# Patient Record
Sex: FEMALE | Race: BLACK OR AFRICAN AMERICAN | NOT HISPANIC OR LATINO | Employment: UNEMPLOYED | URBAN - METROPOLITAN AREA
[De-identification: names, ages, dates, MRNs, and addresses within clinical notes are randomized per-mention and may not be internally consistent; named-entity substitution may affect disease eponyms.]

---

## 2020-06-26 ENCOUNTER — HISTORICAL (OUTPATIENT)
Dept: RADIOLOGY | Facility: HOSPITAL | Age: 50
End: 2020-06-26

## 2020-10-22 ENCOUNTER — HISTORICAL (OUTPATIENT)
Dept: RADIOLOGY | Facility: HOSPITAL | Age: 50
End: 2020-10-22

## 2021-10-06 ENCOUNTER — HISTORICAL (OUTPATIENT)
Dept: HEMATOLOGY/ONCOLOGY | Facility: CLINIC | Age: 51
End: 2021-10-06

## 2022-06-06 RX ORDER — OMEPRAZOLE 40 MG/1
40 CAPSULE, DELAYED RELEASE ORAL DAILY
Qty: 30 CAPSULE | Refills: 5 | Status: SHIPPED | OUTPATIENT
Start: 2022-06-06 | End: 2023-06-08 | Stop reason: SDUPTHER

## 2022-09-02 RX ORDER — MELOXICAM 15 MG/1
TABLET ORAL
Qty: 30 TABLET | Refills: 5 | Status: SHIPPED | OUTPATIENT
Start: 2022-09-02 | End: 2023-06-08 | Stop reason: SDUPTHER

## 2022-09-02 RX ORDER — HYDROXYCHLOROQUINE SULFATE 200 MG/1
TABLET, FILM COATED ORAL
Qty: 60 TABLET | Refills: 5 | Status: SHIPPED | OUTPATIENT
Start: 2022-09-02 | End: 2023-06-08 | Stop reason: SDUPTHER

## 2022-09-02 RX ORDER — PRAMIPEXOLE DIHYDROCHLORIDE 0.12 MG/1
TABLET ORAL
Qty: 30 TABLET | Refills: 5 | Status: SHIPPED | OUTPATIENT
Start: 2022-09-02 | End: 2023-06-08 | Stop reason: SDUPTHER

## 2022-10-31 DIAGNOSIS — J45.998 ALLERGIC-INFECTIVE ASTHMA: Primary | ICD-10-CM

## 2022-11-14 ENCOUNTER — PROCEDURE VISIT (OUTPATIENT)
Dept: RESPIRATORY THERAPY | Facility: HOSPITAL | Age: 52
End: 2022-11-14
Attending: INTERNAL MEDICINE
Payer: MEDICAID

## 2022-11-14 DIAGNOSIS — J45.998 ALLERGIC-INFECTIVE ASTHMA: ICD-10-CM

## 2022-11-14 PROCEDURE — 94060 EVALUATION OF WHEEZING: CPT

## 2022-11-14 PROCEDURE — 94729 DIFFUSING CAPACITY: CPT

## 2022-11-14 PROCEDURE — 94727 GAS DIL/WSHOT DETER LNG VOL: CPT

## 2022-11-14 RX ORDER — ALBUTEROL SULFATE 0.83 MG/ML
2.5 SOLUTION RESPIRATORY (INHALATION)
Status: COMPLETED | OUTPATIENT
Start: 2022-11-14 | End: 2022-11-14

## 2022-11-14 RX ADMIN — ALBUTEROL SULFATE 2.5 MG: 0.83 SOLUTION RESPIRATORY (INHALATION) at 09:11

## 2023-05-23 PROBLEM — M79.7 FIBROMYALGIA: Status: ACTIVE | Noted: 2023-05-23

## 2023-05-23 PROBLEM — G47.00 INSOMNIA: Status: ACTIVE | Noted: 2023-05-23

## 2023-05-23 PROBLEM — M35.9 UNDIFFERENTIATED CONNECTIVE TISSUE DISEASE: Status: ACTIVE | Noted: 2023-05-23

## 2023-05-23 PROBLEM — G25.81 RESTLESS LEG SYNDROME: Status: ACTIVE | Noted: 2023-05-23

## 2023-06-08 ENCOUNTER — OFFICE VISIT (OUTPATIENT)
Dept: RHEUMATOLOGY | Facility: CLINIC | Age: 53
End: 2023-06-08
Payer: MEDICAID

## 2023-06-08 VITALS
SYSTOLIC BLOOD PRESSURE: 118 MMHG | BODY MASS INDEX: 19.74 KG/M2 | RESPIRATION RATE: 18 BRPM | HEIGHT: 71 IN | OXYGEN SATURATION: 99 % | TEMPERATURE: 98 F | DIASTOLIC BLOOD PRESSURE: 84 MMHG | HEART RATE: 72 BPM | WEIGHT: 141 LBS

## 2023-06-08 DIAGNOSIS — G25.81 RESTLESS LEG SYNDROME: ICD-10-CM

## 2023-06-08 DIAGNOSIS — M35.9 UNDIFFERENTIATED CONNECTIVE TISSUE DISEASE: ICD-10-CM

## 2023-06-08 DIAGNOSIS — G47.00 INSOMNIA, UNSPECIFIED TYPE: ICD-10-CM

## 2023-06-08 DIAGNOSIS — M79.7 FIBROMYALGIA: ICD-10-CM

## 2023-06-08 PROCEDURE — 99214 PR OFFICE/OUTPT VISIT, EST, LEVL IV, 30-39 MIN: ICD-10-PCS | Mod: S$PBB,,,

## 2023-06-08 PROCEDURE — 3074F SYST BP LT 130 MM HG: CPT | Mod: CPTII,,,

## 2023-06-08 PROCEDURE — 3074F PR MOST RECENT SYSTOLIC BLOOD PRESSURE < 130 MM HG: ICD-10-PCS | Mod: CPTII,,,

## 2023-06-08 PROCEDURE — 3079F PR MOST RECENT DIASTOLIC BLOOD PRESSURE 80-89 MM HG: ICD-10-PCS | Mod: CPTII,,,

## 2023-06-08 PROCEDURE — 99214 OFFICE O/P EST MOD 30 MIN: CPT | Mod: S$PBB,,,

## 2023-06-08 PROCEDURE — 99999 PR PBB SHADOW E&M-EST. PATIENT-LVL IV: ICD-10-PCS | Mod: PBBFAC,,,

## 2023-06-08 PROCEDURE — 1159F PR MEDICATION LIST DOCUMENTED IN MEDICAL RECORD: ICD-10-PCS | Mod: CPTII,,,

## 2023-06-08 PROCEDURE — 99214 OFFICE O/P EST MOD 30 MIN: CPT | Mod: PBBFAC

## 2023-06-08 PROCEDURE — 3008F BODY MASS INDEX DOCD: CPT | Mod: CPTII,,,

## 2023-06-08 PROCEDURE — 3008F PR BODY MASS INDEX (BMI) DOCUMENTED: ICD-10-PCS | Mod: CPTII,,,

## 2023-06-08 PROCEDURE — 1159F MED LIST DOCD IN RCRD: CPT | Mod: CPTII,,,

## 2023-06-08 PROCEDURE — 99999 PR PBB SHADOW E&M-EST. PATIENT-LVL IV: CPT | Mod: PBBFAC,,,

## 2023-06-08 PROCEDURE — 3079F DIAST BP 80-89 MM HG: CPT | Mod: CPTII,,,

## 2023-06-08 RX ORDER — ALBUTEROL SULFATE 1.25 MG/3ML
1 SOLUTION RESPIRATORY (INHALATION) EVERY 6 HOURS PRN
COMMUNITY

## 2023-06-08 RX ORDER — GABAPENTIN 300 MG/1
300 CAPSULE ORAL NIGHTLY
COMMUNITY
Start: 2023-02-19 | End: 2023-06-08 | Stop reason: SDUPTHER

## 2023-06-08 RX ORDER — MELOXICAM 15 MG/1
TABLET ORAL
Qty: 30 TABLET | Refills: 5 | Status: SHIPPED | OUTPATIENT
Start: 2023-06-08 | End: 2023-09-08

## 2023-06-08 RX ORDER — HYDROXYCHLOROQUINE SULFATE 200 MG/1
TABLET, FILM COATED ORAL
Qty: 60 TABLET | Refills: 5 | Status: SHIPPED | OUTPATIENT
Start: 2023-06-08 | End: 2023-09-08 | Stop reason: SDUPTHER

## 2023-06-08 RX ORDER — GABAPENTIN 300 MG/1
300 CAPSULE ORAL NIGHTLY
Qty: 30 CAPSULE | Refills: 5 | Status: SHIPPED | OUTPATIENT
Start: 2023-06-08 | End: 2023-09-08 | Stop reason: SDUPTHER

## 2023-06-08 RX ORDER — BUDESONIDE AND FORMOTEROL FUMARATE DIHYDRATE 160; 4.5 UG/1; UG/1
2 AEROSOL RESPIRATORY (INHALATION) DAILY
COMMUNITY
Start: 2023-05-19 | End: 2024-03-14

## 2023-06-08 RX ORDER — CYPROHEPTADINE HYDROCHLORIDE 4 MG/1
4 TABLET ORAL 2 TIMES DAILY
COMMUNITY
Start: 2023-06-05

## 2023-06-08 RX ORDER — PRAMIPEXOLE DIHYDROCHLORIDE 0.12 MG/1
0.12 TABLET ORAL NIGHTLY
Qty: 30 TABLET | Refills: 5 | Status: SHIPPED | OUTPATIENT
Start: 2023-06-08 | End: 2023-09-08 | Stop reason: SDUPTHER

## 2023-06-08 RX ORDER — ALBUTEROL SULFATE 90 UG/1
2 AEROSOL, METERED RESPIRATORY (INHALATION)
COMMUNITY
Start: 2022-12-27 | End: 2023-09-14 | Stop reason: SDUPTHER

## 2023-06-08 RX ORDER — OMEPRAZOLE 40 MG/1
40 CAPSULE, DELAYED RELEASE ORAL DAILY
Qty: 30 CAPSULE | Refills: 5 | Status: SHIPPED | OUTPATIENT
Start: 2023-06-08 | End: 2023-09-08 | Stop reason: SDUPTHER

## 2023-06-08 NOTE — ASSESSMENT & PLAN NOTE
Restart hydroxychloroquine 200 mg b.i.d.  Restart meloxicam 15 mg daily  Continue omeprazole 40 mg daily    Labs ordered today

## 2023-06-08 NOTE — ASSESSMENT & PLAN NOTE
The medications restarted today should also help with her sleep  Avoid caffeine, alcohol and stimulants.  Power down electronic devices at least one hour prior to bedtime.  Keep room dark; use eye mask or relaxation sound machine to promote rest.  Sleep hygiene refers to actions that tend to improve and maintain good sleep:  Sleep as long as necessary to feel rested (usually seven to eight hours for adults) and then get out of bed  Maintain a regular sleep schedule, particularly a regular wake-up time in the morning  Avoid smoking or other nicotine intake, particularly during the evening

## 2023-06-08 NOTE — PROGRESS NOTES
Subjective:           Patient ID: Sarah Saravia is a 52 y.o. female.    Chief Complaint: Follow-up (Right leg pain and spasms )       is a 51 y/o female here for a f./u. She was initially a self referral for joint poin and shortness of breath. She established care with Dr. Marshall on 11/5/2021 . Past labs: YING 1:80 Nucleolar, (look at labs from [facility]). And she was initiated on hydroxychloroquine.  She did have 1 follow-up appointment on March 2022 with Dr. Marshall with no medicine changes and she has been lost to follow up. Her last Rheumatology appointment was 3/11/22. (Over 1 year ago) No recent labs to review. She does report that she is not taking any medicine right now because she didn't have any more refills.   Today she reports joint pain involving the MCP PIP wrist elbow shoulders hips knees and ankles bilaterally.  The pain is 2/10 in intensity dull in quality and continuous.  That is associated with a morning stiffness lasting for less than 60 minutes.  Is also having difficulty maintaining a good night of sleep.  This has been associated with myalgias.  Muscle aches are 4/10 in intensity dull in quality and continuous. Also reports shocking pain on right lower extremity leg and restless leg overnight They are associated with fatigue. Denies fever, chills or recent infections.         Review of Systems   Constitutional:  Negative for appetite change, chills and fever.   HENT:  Negative for congestion, ear pain, mouth sores, nosebleeds and trouble swallowing.    Eyes:  Negative for photophobia and discharge.   Respiratory:  Negative for chest tightness and shortness of breath.    Cardiovascular:  Negative for chest pain.   Gastrointestinal:  Negative for abdominal pain and vomiting.   Endocrine: Negative.    Genitourinary:  Negative for hematuria.   Musculoskeletal:         As per HPI   Skin:  Negative for rash.   Neurological:  Negative for weakness.       Objective:   /84 (BP  "Location: Left arm, Patient Position: Sitting, BP Method: Medium (Automatic))   Pulse 72   Temp 98.3 °F (36.8 °C) (Oral)   Resp 18   Ht 5' 11" (1.803 m)   Wt 64 kg (141 lb)   SpO2 99%   BMI 19.67 kg/m²          Physical Exam   Constitutional: She is oriented to person, place, and time. She appears well-developed and well-nourished. No distress.   HENT:   Head: Normocephalic and atraumatic.   Right Ear: External ear normal.   Left Ear: External ear normal.   Eyes: Pupils are equal, round, and reactive to light.   Cardiovascular: Normal rate.   Pulmonary/Chest: Effort normal.   Abdominal: Soft. There is no abdominal tenderness.   Musculoskeletal:      Cervical back: Neck supple.   Lymphadenopathy:     She has no cervical adenopathy.   Neurological: She is alert and oriented to person, place, and time. She displays normal reflexes. No cranial nerve deficit or sensory deficit. She exhibits normal muscle tone. Coordination normal.   Skin: No rash noted. No erythema.   Vitals reviewed.      Right Side Rheumatological Exam     Examination finds the 1st PIP, 1st MCP, 2nd PIP, 2nd MCP, 3rd PIP, 3rd MCP, 4th PIP, 4th MCP, 5th PIP and 5th MCP normal.    Left Side Rheumatological Exam     Examination finds the 1st PIP, 1st MCP, 2nd PIP, 2nd MCP, 3rd PIP, 3rd MCP, 4th PIP, 4th MCP, 5th PIP and 5th MCP normal.         Completed Fibromyalgia exam 12/18 tender points.    No data to display     Assessment:         Medication List with Changes/Refills   Current Medications    ALBUTEROL (ACCUNEB) 1.25 MG/3 ML NEBU    Inhale 1 ampule into the lungs every 6 (six) hours as needed.       Start Date: --        End Date: --    ALBUTEROL (PROVENTIL/VENTOLIN HFA) 90 MCG/ACTUATION INHALER    Inhale 2 puffs into the lungs every 4 to 6 hours as needed.       Start Date: 12/27/2022End Date: --    CYPROHEPTADINE (PERIACTIN) 4 MG TABLET    Take 4 mg by mouth 2 (two) times daily.       Start Date: 6/5/2023  End Date: --    SYMBICORT 160-4.5 " MCG/ACTUATION HFAA    Inhale 2 puffs into the lungs once daily.       Start Date: 5/19/2023 End Date: --   Changed and/or Refilled Medications    Modified Medication Previous Medication    GABAPENTIN (NEURONTIN) 300 MG CAPSULE gabapentin (NEURONTIN) 300 MG capsule       Take 1 capsule (300 mg total) by mouth every evening.    Take 300 mg by mouth every evening.       Start Date: 6/8/2023  End Date: --    Start Date: 2/19/2023 End Date: 6/8/2023    HYDROXYCHLOROQUINE (PLAQUENIL) 200 MG TABLET hydrOXYchloroQUINE (PLAQUENIL) 200 mg tablet       TAKE 1 TABLET BY MOUTH TWICE DAILY WITH FOOD OR MILK    TAKE 1 TABLET BY MOUTH TWICE DAILY WITH FOOD OR MILK       Start Date: 6/8/2023  End Date: --    Start Date: 9/2/2022  End Date: 6/8/2023    MELOXICAM (MOBIC) 15 MG TABLET meloxicam (MOBIC) 15 MG tablet       TAKE 1 TABLET BY MOUTH DAILY AS NEEDED FOR PAIN AFTER LUNCH    TAKE 1 TABLET BY MOUTH DAILY AS NEEDED FOR PAIN AFTER LUNCH       Start Date: 6/8/2023  End Date: --    Start Date: 9/2/2022  End Date: 6/8/2023    OMEPRAZOLE (PRILOSEC) 40 MG CAPSULE omeprazole (PRILOSEC) 40 MG capsule       Take 1 capsule (40 mg total) by mouth once daily.    Take 1 capsule (40 mg total) by mouth once daily.       Start Date: 6/8/2023  End Date: 6/7/2024    Start Date: 6/6/2022  End Date: 6/8/2023    PRAMIPEXOLE (MIRAPEX) 0.125 MG TABLET pramipexole (MIRAPEX) 0.125 MG tablet       Take 1 tablet (0.125 mg total) by mouth every evening.    TAKE 1 TABLET BY MOUTH AT BEDTIME       Start Date: 6/8/2023  End Date: --    Start Date: 9/2/2022  End Date: 6/8/2023         ICD-10-CM ICD-9-CM   1. Restless leg syndrome  G25.81 333.94   2. Undifferentiated connective tissue disease  M35.9 710.9   3. Fibromyalgia  M79.7 729.1   4. Insomnia, unspecified type  G47.00 780.52           Plan:       1. Restless leg syndrome  Assessment & Plan:  Restart Mirapex 0.125 mg nightly    Orders:  -     pramipexole (MIRAPEX) 0.125 MG tablet; Take 1 tablet (0.125 mg  total) by mouth every evening.  Dispense: 30 tablet; Refill: 5    2. Undifferentiated connective tissue disease  Assessment & Plan:  Restart hydroxychloroquine 200 mg b.i.d.  Restart meloxicam 15 mg daily  Continue omeprazole 40 mg daily    Labs ordered today    Orders:  -     hydrOXYchloroQUINE (PLAQUENIL) 200 mg tablet; TAKE 1 TABLET BY MOUTH TWICE DAILY WITH FOOD OR MILK  Dispense: 60 tablet; Refill: 5  -     meloxicam (MOBIC) 15 MG tablet; TAKE 1 TABLET BY MOUTH DAILY AS NEEDED FOR PAIN AFTER LUNCH  Dispense: 30 tablet; Refill: 5  -     omeprazole (PRILOSEC) 40 MG capsule; Take 1 capsule (40 mg total) by mouth once daily.  Dispense: 30 capsule; Refill: 5  -     CBC Auto Differential; Future; Expected date: 06/08/2023  -     Comprehensive Metabolic Panel; Future; Expected date: 06/08/2023  -     CRP, High Sensitivity; Future; Expected date: 06/08/2023  -     Vitamin D; Future; Expected date: 06/08/2023  -     Rheumatoid Quantitative; Future; Expected date: 06/08/2023  -     Cyclic Citrullinated Peptide Antibody, IgG; Future; Expected date: 06/08/2023  -     Antinuclear Ab, HEp-2 Substrate; Future; Expected date: 06/08/2023  -     Hepatitis B Surface Antigen; Future; Expected date: 06/08/2023  -     Hepatitis C Antibody; Future; Expected date: 06/08/2023  -     TSH; Future; Expected date: 06/08/2023  -     T4, Free; Future; Expected date: 06/08/2023    3. Fibromyalgia  Assessment & Plan:  Restart gabapentin 300 mg nightly    Orders:  -     gabapentin (NEURONTIN) 300 MG capsule; Take 1 capsule (300 mg total) by mouth every evening.  Dispense: 30 capsule; Refill: 5    4. Insomnia, unspecified type  Assessment & Plan:  The medications restarted today should also help with her sleep  Avoid caffeine, alcohol and stimulants.  Power down electronic devices at least one hour prior to bedtime.  Keep room dark; use eye mask or relaxation sound machine to promote rest.  Sleep hygiene refers to actions that tend to improve  and maintain good sleep:  Sleep as long as necessary to feel rested (usually seven to eight hours for adults) and then get out of bed  Maintain a regular sleep schedule, particularly a regular wake-up time in the morning  Avoid smoking or other nicotine intake, particularly during the evening

## 2023-06-14 ENCOUNTER — TELEPHONE (OUTPATIENT)
Dept: RHEUMATOLOGY | Facility: CLINIC | Age: 53
End: 2023-06-14
Payer: MEDICAID

## 2023-06-14 DIAGNOSIS — E55.9 VITAMIN D DEFICIENCY: Primary | ICD-10-CM

## 2023-06-14 RX ORDER — ERGOCALCIFEROL 1.25 MG/1
50000 CAPSULE ORAL
Qty: 5 CAPSULE | Refills: 3 | Status: SHIPPED | OUTPATIENT
Start: 2023-06-14

## 2023-06-14 NOTE — TELEPHONE ENCOUNTER
Labs reviewed. Her labs are consistent with her diagnosis with  undifferentiated connective tissue disease. It does show that she still has inflammation but the hydroxychloroquine will help with this. Her liver functions,kidney functions, and thyroid are all normal. Her RBC is high but I will send these labs to her primary care, Kailey Pollack. She should continue all of her medications prescribed at her visit.    Maria Isabel, Please fax her labs to her PCP Kailey Pollack at [facility]  Fax: [phone]

## 2023-09-07 NOTE — ASSESSMENT & PLAN NOTE
Currently taking  mg daily  Increase  hydroxychloroquine to  200 mg b.i.d.  Stop meloxicam 15 mg daily (therapy not effective)  Start Diclofenac 75 mg daily PRN for pain  Continue omeprazole 40 mg daily     Labs ordered at last visit and reviewed with patient today during this office visit. Recent labs completed with PCP- will request those labs

## 2023-09-07 NOTE — PROGRESS NOTES
Subjective:           Patient ID: Sarah Saravia is a 53 y.o. female.    Chief Complaint: Follow-up (Patient has some concern about her medications. /She isn't to taste or smell for 3 weeks now. She did do/A at home covid test  a week ago . )       is a 51 y/o female here for a f./u. She was initially a self referral for joint poin and shortness of breath. She established care with Dr. Marshall on 11/5/2021 . Past labs: YING 1:80 Nucleolar, (look at labs from [facility]). And she was initiated on hydroxychloroquine.  She did have 1 follow-up appointment on March 2022 with Dr. Marshall with no medicine changes and she has been lost to follow up. Her last Rheumatology appointment was 3/11/22. (Over 1 year ago) She reports her PCP recently did blood work and she was told that she has a fatty liver- will request those labs as her liver enzymes were NML with her last blood work. She did show an abdominal ultrasound result from [facility] which showed renal cysts, splenomegaly and mild hepatomegaly. She reports that she does have improved joint pain since starting restarting the  medication. Reports restless legs during the day and she sometimes takes Mirapex in the AM and PM.     Today she reports joint pain involving the MCP PIP wrist elbow shoulders hips knees and ankles bilaterally.  The pain is 2/10 in intensity dull in quality and continuous.  That is associated with a morning stiffness lasting for less than 60 minutes.  Is also having difficulty maintaining a good night of sleep.  This has been associated with myalgias.  Muscle aches are 4/10 in intensity dull in quality and continuous. A They are associated with fatigue. Denies fever, chills or recent infections.           Review of Systems   Constitutional:  Negative for appetite change, chills and fever.   HENT:  Negative for congestion, ear pain, mouth sores, nosebleeds and trouble swallowing.    Eyes:  Negative for photophobia and discharge.  "  Respiratory:  Negative for chest tightness and shortness of breath.    Cardiovascular:  Negative for chest pain.   Gastrointestinal:  Negative for abdominal pain and vomiting.   Endocrine: Negative.    Genitourinary:  Negative for hematuria.   Musculoskeletal:         As per HPI   Skin:  Negative for rash.   Neurological:  Negative for weakness.         Objective:   /76 (BP Location: Right arm, Patient Position: Sitting, BP Method: Medium (Automatic))   Pulse 77   Temp 98.1 °F (36.7 °C) (Oral)   Resp 18   Ht 5' 11" (1.803 m)   Wt 65.5 kg (144 lb 6.4 oz)   SpO2 95%   BMI 20.14 kg/m²          Physical Exam   Constitutional: She is oriented to person, place, and time. She appears well-developed and well-nourished. No distress.   HENT:   Head: Normocephalic and atraumatic.   Right Ear: External ear normal.   Left Ear: External ear normal.   Eyes: Pupils are equal, round, and reactive to light.   Cardiovascular: Normal rate.   Pulmonary/Chest: Effort normal.   Abdominal: Soft. There is no abdominal tenderness.   Musculoskeletal:      Cervical back: Neck supple.   Lymphadenopathy:     She has no cervical adenopathy.   Neurological: She is alert and oriented to person, place, and time. She displays normal reflexes. No cranial nerve deficit or sensory deficit. She exhibits normal muscle tone. Coordination normal.   Skin: No rash noted. No erythema.   Vitals reviewed.      Right Side Rheumatological Exam     Examination finds the 1st PIP, 1st MCP, 2nd PIP, 2nd MCP, 3rd PIP, 3rd MCP, 4th PIP, 4th MCP, 5th PIP and 5th MCP normal.    Left Side Rheumatological Exam     Examination finds the 1st PIP, 1st MCP, 2nd PIP, 2nd MCP, 3rd PIP, 3rd MCP, 4th PIP, 4th MCP, 5th PIP and 5th MCP normal.           Completed Fibromyalgia exam 12/18 tender points.    No data to display     Assessment:         Medication List with Changes/Refills   New Medications    DICLOFENAC (VOLTAREN) 75 MG EC TABLET    Take 1 tablet (75 mg " total) by mouth daily as needed (pain, after food).       Start Date: 9/8/2023  End Date: --    MAGNESIUM OXIDE (MAG-OX) 400 MG (241.3 MG MAGNESIUM) TABLET    Take 1 tablet (400 mg total) by mouth nightly.       Start Date: 9/8/2023  End Date: --   Current Medications    ALBUTEROL (ACCUNEB) 1.25 MG/3 ML NEBU    Inhale 1 ampule into the lungs every 6 (six) hours as needed.       Start Date: --        End Date: --    ALBUTEROL (PROVENTIL/VENTOLIN HFA) 90 MCG/ACTUATION INHALER    Inhale 2 puffs into the lungs every 4 to 6 hours as needed.       Start Date: 12/27/2022End Date: --    CYPROHEPTADINE (PERIACTIN) 4 MG TABLET    Take 4 mg by mouth 2 (two) times daily.       Start Date: 6/5/2023  End Date: --    ERGOCALCIFEROL (ERGOCALCIFEROL) 50,000 UNIT CAP    Take 1 capsule (50,000 Units total) by mouth every 7 days.       Start Date: 6/14/2023 End Date: --    FERROUS GLUCONATE (FERGON) 324 MG TABLET    Take 1 tablet by mouth once a week.       Start Date: --        End Date: --    FLUTICASONE PROPIONATE (FLONASE) 50 MCG/ACTUATION NASAL SPRAY    1 spray by Each Nostril route as needed.       Start Date: 8/22/2023 End Date: --    SYMBICORT 160-4.5 MCG/ACTUATION HFAA    Inhale 2 puffs into the lungs once daily.       Start Date: 5/19/2023 End Date: --   Changed and/or Refilled Medications    Modified Medication Previous Medication    GABAPENTIN (NEURONTIN) 300 MG CAPSULE gabapentin (NEURONTIN) 300 MG capsule       Take 1 capsule (300 mg total) by mouth every evening.    Take 1 capsule (300 mg total) by mouth every evening.       Start Date: 9/8/2023  End Date: --    Start Date: 6/8/2023  End Date: 9/8/2023    HYDROXYCHLOROQUINE (PLAQUENIL) 200 MG TABLET hydrOXYchloroQUINE (PLAQUENIL) 200 mg tablet       TAKE 1 TABLET BY MOUTH TWICE DAILY WITH FOOD OR MILK    TAKE 1 TABLET BY MOUTH TWICE DAILY WITH FOOD OR MILK       Start Date: 9/8/2023  End Date: --    Start Date: 6/8/2023  End Date: 9/8/2023    OMEPRAZOLE (PRILOSEC) 40 MG  CAPSULE omeprazole (PRILOSEC) 40 MG capsule       Take 1 capsule (40 mg total) by mouth once daily.    Take 1 capsule (40 mg total) by mouth once daily.       Start Date: 9/8/2023  End Date: 9/7/2024    Start Date: 6/8/2023  End Date: 9/8/2023    PRAMIPEXOLE (MIRAPEX) 0.25 MG TABLET pramipexole (MIRAPEX) 0.125 MG tablet       Take 1 tablet (0.25 mg total) by mouth every evening.    Take 1 tablet (0.125 mg total) by mouth every evening.       Start Date: 9/8/2023  End Date: --    Start Date: 6/8/2023  End Date: 9/8/2023   Discontinued Medications    MELOXICAM (MOBIC) 15 MG TABLET    TAKE 1 TABLET BY MOUTH DAILY AS NEEDED FOR PAIN AFTER LUNCH       Start Date: 6/8/2023  End Date: 9/8/2023         ICD-10-CM ICD-9-CM   1. Undifferentiated connective tissue disease  M35.9 710.9   2. Fibromyalgia  M79.7 729.1   3. Insomnia, unspecified type  G47.00 780.52   4. Restless leg syndrome  G25.81 333.94           Plan:       1. Undifferentiated connective tissue disease  Overview:  YING 1:320 Nucleolar  YING 1:160 Homogenous      Assessment & Plan:  Currently taking  mg daily  Increase  hydroxychloroquine to  200 mg b.i.d.  Stop meloxicam 15 mg daily (therapy not effective)  Start Diclofenac 75 mg daily PRN for pain  Continue omeprazole 40 mg daily     Labs ordered at last visit and reviewed with patient today during this office visit. Recent labs completed with PCP- will request those labs    Orders:  -     hydrOXYchloroQUINE (PLAQUENIL) 200 mg tablet; TAKE 1 TABLET BY MOUTH TWICE DAILY WITH FOOD OR MILK  Dispense: 60 tablet; Refill: 5  -     omeprazole (PRILOSEC) 40 MG capsule; Take 1 capsule (40 mg total) by mouth once daily.  Dispense: 30 capsule; Refill: 5    2. Fibromyalgia  Assessment & Plan:  Continue gabapentin 300 mg nightly  Continue mageneium oxide 400 mg nightly      Orders:  -     gabapentin (NEURONTIN) 300 MG capsule; Take 1 capsule (300 mg total) by mouth every evening.  Dispense: 30 capsule; Refill: 5  -      magnesium oxide (MAG-OX) 400 mg (241.3 mg magnesium) tablet; Take 1 tablet (400 mg total) by mouth nightly.  Dispense: 30 tablet; Refill: 5    3. Insomnia, unspecified type  Assessment & Plan:  The medications restarted today should also help with her sleep  Avoid caffeine, alcohol and stimulants.  Power down electronic devices at least one hour prior to bedtime.  Keep room dark; use eye mask or relaxation sound machine to promote rest.  Sleep hygiene refers to actions that tend to improve and maintain good sleep:  Sleep as long as necessary to feel rested (usually seven to eight hours for adults) and then get out of bed  Maintain a regular sleep schedule, particularly a regular wake-up time in the morning  Avoid smoking or other nicotine intake, particularly during the evening         4. Restless leg syndrome  Assessment & Plan:  Increase Mirapex 0.125 mg nightly    Orders:  -     pramipexole (MIRAPEX) 0.25 MG tablet; Take 1 tablet (0.25 mg total) by mouth every evening.  Dispense: 30 tablet; Refill: 5    Other orders  -     diclofenac (VOLTAREN) 75 MG EC tablet; Take 1 tablet (75 mg total) by mouth daily as needed (pain, after food).  Dispense: 30 tablet; Refill: 5

## 2023-09-08 ENCOUNTER — OFFICE VISIT (OUTPATIENT)
Dept: RHEUMATOLOGY | Facility: CLINIC | Age: 53
End: 2023-09-08
Payer: MEDICAID

## 2023-09-08 VITALS
WEIGHT: 144.38 LBS | OXYGEN SATURATION: 95 % | DIASTOLIC BLOOD PRESSURE: 76 MMHG | SYSTOLIC BLOOD PRESSURE: 115 MMHG | TEMPERATURE: 98 F | RESPIRATION RATE: 18 BRPM | BODY MASS INDEX: 20.21 KG/M2 | HEIGHT: 71 IN | HEART RATE: 77 BPM

## 2023-09-08 DIAGNOSIS — G47.00 INSOMNIA, UNSPECIFIED TYPE: ICD-10-CM

## 2023-09-08 DIAGNOSIS — M79.7 FIBROMYALGIA: ICD-10-CM

## 2023-09-08 DIAGNOSIS — M35.9 UNDIFFERENTIATED CONNECTIVE TISSUE DISEASE: ICD-10-CM

## 2023-09-08 DIAGNOSIS — G25.81 RESTLESS LEG SYNDROME: ICD-10-CM

## 2023-09-08 PROCEDURE — 99999 PR PBB SHADOW E&M-EST. PATIENT-LVL IV: CPT | Mod: PBBFAC,,,

## 2023-09-08 PROCEDURE — 1159F MED LIST DOCD IN RCRD: CPT | Mod: CPTII,,,

## 2023-09-08 PROCEDURE — 3008F PR BODY MASS INDEX (BMI) DOCUMENTED: ICD-10-PCS | Mod: CPTII,,,

## 2023-09-08 PROCEDURE — 99214 OFFICE O/P EST MOD 30 MIN: CPT | Mod: PBBFAC

## 2023-09-08 PROCEDURE — 99214 PR OFFICE/OUTPT VISIT, EST, LEVL IV, 30-39 MIN: ICD-10-PCS | Mod: S$PBB,,,

## 2023-09-08 PROCEDURE — 3078F PR MOST RECENT DIASTOLIC BLOOD PRESSURE < 80 MM HG: ICD-10-PCS | Mod: CPTII,,,

## 2023-09-08 PROCEDURE — 3008F BODY MASS INDEX DOCD: CPT | Mod: CPTII,,,

## 2023-09-08 PROCEDURE — 3078F DIAST BP <80 MM HG: CPT | Mod: CPTII,,,

## 2023-09-08 PROCEDURE — 99214 OFFICE O/P EST MOD 30 MIN: CPT | Mod: S$PBB,,,

## 2023-09-08 PROCEDURE — 1159F PR MEDICATION LIST DOCUMENTED IN MEDICAL RECORD: ICD-10-PCS | Mod: CPTII,,,

## 2023-09-08 PROCEDURE — 99999 PR PBB SHADOW E&M-EST. PATIENT-LVL IV: ICD-10-PCS | Mod: PBBFAC,,,

## 2023-09-08 PROCEDURE — 3074F PR MOST RECENT SYSTOLIC BLOOD PRESSURE < 130 MM HG: ICD-10-PCS | Mod: CPTII,,,

## 2023-09-08 PROCEDURE — 3074F SYST BP LT 130 MM HG: CPT | Mod: CPTII,,,

## 2023-09-08 RX ORDER — LANOLIN ALCOHOL/MO/W.PET/CERES
400 CREAM (GRAM) TOPICAL NIGHTLY
Qty: 30 TABLET | Refills: 5 | Status: SHIPPED | OUTPATIENT
Start: 2023-09-08 | End: 2024-03-14 | Stop reason: SDUPTHER

## 2023-09-08 RX ORDER — DICLOFENAC SODIUM 75 MG/1
75 TABLET, DELAYED RELEASE ORAL DAILY PRN
Qty: 30 TABLET | Refills: 5 | Status: SHIPPED | OUTPATIENT
Start: 2023-09-08

## 2023-09-08 RX ORDER — PRAMIPEXOLE DIHYDROCHLORIDE 0.25 MG/1
0.25 TABLET ORAL NIGHTLY
Qty: 30 TABLET | Refills: 5 | Status: SHIPPED | OUTPATIENT
Start: 2023-09-08

## 2023-09-08 RX ORDER — GABAPENTIN 300 MG/1
300 CAPSULE ORAL NIGHTLY
Qty: 30 CAPSULE | Refills: 5 | Status: SHIPPED | OUTPATIENT
Start: 2023-09-08 | End: 2024-03-05 | Stop reason: SDUPTHER

## 2023-09-08 RX ORDER — HYDROXYCHLOROQUINE SULFATE 200 MG/1
TABLET, FILM COATED ORAL
Qty: 60 TABLET | Refills: 5 | Status: SHIPPED | OUTPATIENT
Start: 2023-09-08 | End: 2024-03-14 | Stop reason: SDUPTHER

## 2023-09-08 RX ORDER — OMEPRAZOLE 40 MG/1
40 CAPSULE, DELAYED RELEASE ORAL DAILY
Qty: 30 CAPSULE | Refills: 5 | Status: SHIPPED | OUTPATIENT
Start: 2023-09-08 | End: 2024-03-14

## 2023-09-08 RX ORDER — FERROUS GLUCONATE 324(38)MG
1 TABLET ORAL WEEKLY
COMMUNITY

## 2023-09-08 RX ORDER — FLUTICASONE PROPIONATE 50 MCG
1 SPRAY, SUSPENSION (ML) NASAL
COMMUNITY
Start: 2023-08-22

## 2023-09-14 ENCOUNTER — HOSPITAL ENCOUNTER (EMERGENCY)
Facility: HOSPITAL | Age: 53
Discharge: HOME | End: 2023-09-14
Attending: FAMILY MEDICINE
Payer: MEDICAID

## 2023-09-14 VITALS
OXYGEN SATURATION: 97 % | RESPIRATION RATE: 22 BRPM | SYSTOLIC BLOOD PRESSURE: 125 MMHG | DIASTOLIC BLOOD PRESSURE: 90 MMHG | HEART RATE: 88 BPM | TEMPERATURE: 98 F

## 2023-09-14 DIAGNOSIS — J44.1 COPD EXACERBATION: Primary | ICD-10-CM

## 2023-09-14 LAB
ALBUMIN SERPL-MCNC: 3.9 G/DL (ref 3.5–5)
ALBUMIN/GLOB SERPL: 1 RATIO (ref 1.1–2)
ALP SERPL-CCNC: 68 UNIT/L (ref 40–150)
ALT SERPL-CCNC: 16 UNIT/L (ref 0–55)
ANISOCYTOSIS BLD QL SMEAR: ABNORMAL
AST SERPL-CCNC: 22 UNIT/L (ref 5–34)
BASOPHILS # BLD AUTO: 0.05 X10(3)/MCL
BASOPHILS NFR BLD AUTO: 0.7 %
BILIRUB SERPL-MCNC: 0.5 MG/DL
BUN SERPL-MCNC: 5.1 MG/DL (ref 9.8–20.1)
CALCIUM SERPL-MCNC: 9.5 MG/DL (ref 8.4–10.2)
CHLORIDE SERPL-SCNC: 110 MMOL/L (ref 98–107)
CO2 SERPL-SCNC: 23 MMOL/L (ref 22–29)
CREAT SERPL-MCNC: 0.71 MG/DL (ref 0.55–1.02)
ELLIPTOCYTOSIS (OHS): ABNORMAL
EOSINOPHIL # BLD AUTO: 0.95 X10(3)/MCL (ref 0–0.9)
EOSINOPHIL NFR BLD AUTO: 12.9 %
ERYTHROCYTE [DISTWIDTH] IN BLOOD BY AUTOMATED COUNT: 17.8 % (ref 11.5–17)
GFR SERPLBLD CREATININE-BSD FMLA CKD-EPI: >60 MLS/MIN/1.73/M2
GLOBULIN SER-MCNC: 3.9 GM/DL (ref 2.4–3.5)
GLUCOSE SERPL-MCNC: 100 MG/DL (ref 74–100)
HCT VFR BLD AUTO: 34 % (ref 37–47)
HGB BLD-MCNC: 10.1 G/DL (ref 12–16)
IMM GRANULOCYTES # BLD AUTO: 0.02 X10(3)/MCL (ref 0–0.04)
IMM GRANULOCYTES NFR BLD AUTO: 0.3 %
LYMPHOCYTES # BLD AUTO: 1.58 X10(3)/MCL (ref 0.6–4.6)
LYMPHOCYTES NFR BLD AUTO: 21.4 %
MCH RBC QN AUTO: 17.4 PG (ref 27–31)
MCHC RBC AUTO-ENTMCNC: 29.7 G/DL (ref 33–36)
MCV RBC AUTO: 58.4 FL (ref 80–94)
MICROCYTES BLD QL SMEAR: ABNORMAL
MONOCYTES # BLD AUTO: 0.62 X10(3)/MCL (ref 0.1–1.3)
MONOCYTES NFR BLD AUTO: 8.4 %
NEUTROPHILS # BLD AUTO: 4.15 X10(3)/MCL (ref 2.1–9.2)
NEUTROPHILS NFR BLD AUTO: 56.3 %
NRBC BLD AUTO-RTO: 0 %
PLATELET # BLD AUTO: 168 X10(3)/MCL (ref 130–400)
PLATELET # BLD EST: ADEQUATE 10*3/UL
PLATELETS.RETICULATED NFR BLD AUTO: 11.8 % (ref 0.9–11.2)
PMV BLD AUTO: ABNORMAL FL
POTASSIUM SERPL-SCNC: 3.5 MMOL/L (ref 3.5–5.1)
PROT SERPL-MCNC: 7.8 GM/DL (ref 6.4–8.3)
RBC # BLD AUTO: 5.82 X10(6)/MCL (ref 4.2–5.4)
SODIUM SERPL-SCNC: 143 MMOL/L (ref 136–145)
TROPONIN I SERPL-MCNC: <0.01 NG/ML (ref 0–0.04)
WBC # SPEC AUTO: 7.37 X10(3)/MCL (ref 4.5–11.5)

## 2023-09-14 PROCEDURE — 96375 TX/PRO/DX INJ NEW DRUG ADDON: CPT

## 2023-09-14 PROCEDURE — 84484 ASSAY OF TROPONIN QUANT: CPT | Performed by: FAMILY MEDICINE

## 2023-09-14 PROCEDURE — 94640 AIRWAY INHALATION TREATMENT: CPT

## 2023-09-14 PROCEDURE — 63600175 PHARM REV CODE 636 W HCPCS: Performed by: FAMILY MEDICINE

## 2023-09-14 PROCEDURE — 80053 COMPREHEN METABOLIC PANEL: CPT | Performed by: FAMILY MEDICINE

## 2023-09-14 PROCEDURE — 96365 THER/PROPH/DIAG IV INF INIT: CPT

## 2023-09-14 PROCEDURE — 99285 EMERGENCY DEPT VISIT HI MDM: CPT | Mod: 25

## 2023-09-14 PROCEDURE — 25000003 PHARM REV CODE 250: Performed by: FAMILY MEDICINE

## 2023-09-14 PROCEDURE — 85025 COMPLETE CBC W/AUTO DIFF WBC: CPT | Performed by: FAMILY MEDICINE

## 2023-09-14 PROCEDURE — 25000242 PHARM REV CODE 250 ALT 637 W/ HCPCS: Performed by: FAMILY MEDICINE

## 2023-09-14 RX ORDER — BENZONATATE 100 MG/1
200 CAPSULE ORAL
Status: COMPLETED | OUTPATIENT
Start: 2023-09-14 | End: 2023-09-14

## 2023-09-14 RX ORDER — METHYLPREDNISOLONE 4 MG/1
TABLET ORAL
Qty: 1 EACH | Refills: 0 | OUTPATIENT
Start: 2023-09-14 | End: 2023-10-30

## 2023-09-14 RX ORDER — LIDOCAINE HYDROCHLORIDE 20 MG/ML
10 INJECTION, SOLUTION EPIDURAL; INFILTRATION; INTRACAUDAL; PERINEURAL
Status: COMPLETED | OUTPATIENT
Start: 2023-09-14 | End: 2023-09-14

## 2023-09-14 RX ORDER — MAGNESIUM SULFATE HEPTAHYDRATE 40 MG/ML
2 INJECTION, SOLUTION INTRAVENOUS
Status: COMPLETED | OUTPATIENT
Start: 2023-09-14 | End: 2023-09-14

## 2023-09-14 RX ORDER — IPRATROPIUM BROMIDE AND ALBUTEROL SULFATE 2.5; .5 MG/3ML; MG/3ML
6 SOLUTION RESPIRATORY (INHALATION)
Status: COMPLETED | OUTPATIENT
Start: 2023-09-14 | End: 2023-09-14

## 2023-09-14 RX ORDER — METHYLPREDNISOLONE SOD SUCC 125 MG
125 VIAL (EA) INJECTION
Status: COMPLETED | OUTPATIENT
Start: 2023-09-14 | End: 2023-09-14

## 2023-09-14 RX ORDER — ALBUTEROL SULFATE 90 UG/1
2 AEROSOL, METERED RESPIRATORY (INHALATION)
Qty: 18 G | Refills: 0 | Status: SHIPPED | OUTPATIENT
Start: 2023-09-14

## 2023-09-14 RX ORDER — BENZONATATE 100 MG/1
100 CAPSULE ORAL 3 TIMES DAILY PRN
Qty: 20 CAPSULE | Refills: 0 | Status: SHIPPED | OUTPATIENT
Start: 2023-09-14 | End: 2023-10-05

## 2023-09-14 RX ADMIN — METHYLPREDNISOLONE SODIUM SUCCINATE 125 MG: 125 INJECTION, POWDER, FOR SOLUTION INTRAMUSCULAR; INTRAVENOUS at 01:09

## 2023-09-14 RX ADMIN — BENZONATATE 200 MG: 100 CAPSULE ORAL at 02:09

## 2023-09-14 RX ADMIN — MAGNESIUM SULFATE HEPTAHYDRATE 2 G: 40 INJECTION, SOLUTION INTRAVENOUS at 01:09

## 2023-09-14 RX ADMIN — LIDOCAINE HYDROCHLORIDE 200 MG: 20 INJECTION, SOLUTION INTRAVENOUS at 02:09

## 2023-09-14 RX ADMIN — IPRATROPIUM BROMIDE AND ALBUTEROL SULFATE 6 ML: .5; 3 SOLUTION RESPIRATORY (INHALATION) at 01:09

## 2023-09-14 NOTE — ED NOTES
Seen at an urgent care 3 days ago . Tested negative for flu/covid. New med-Doxycycline. No steroids per patient. Is short of breath. Able to speak 2-3 words between breaths. Audible wheezing. Hx COPD.

## 2023-09-14 NOTE — ED PROVIDER NOTES
Encounter Date: 2023       History     Chief Complaint   Patient presents with    Shortness of Breath     Patient   wheezing.  Dyspnea  noted     Patient 53-year-old female presents emergency room complaints of cough and shortness of breath.  Patient reports symptoms have been ongoing for the last 2 weeks, but symptoms worsened on Monday (3 days prior).  Patient was seen 2 days prior at an urgent care clinic at which time she was prescribed antibiotics and cough medicines, but reports symptoms have continued to worsen.  Patient reports mainly having cough with expiratory wheezing and feels as if she can not catch her breath.  Denies abdominal pain nausea or vomiting.  Reports having sharp chest pain with coughing.    The history is provided by the patient and medical records.     Review of patient's allergies indicates:  No Known Allergies  Past Medical History:   Diagnosis Date    Arthritis     COPD (chronic obstructive pulmonary disease)      Past Surgical History:   Procedure Laterality Date     SECTION       Family History   Problem Relation Age of Onset    Heart disease Mother         My mom have a pace maker    Colon cancer Father     Stomach cancer Father     Breast cancer Sister     Arthritis Maternal Grandmother         My mom mom had arthritis    Heart disease Maternal Grandmother         She had a bad heart    Cancer Sister         My sister had breast cancer she had both of them remove    Cancer Maternal Aunt         My mom sister had breast cancer she had one remove     Social History     Tobacco Use    Smoking status: Never     Passive exposure: Never    Smokeless tobacco: Never   Substance Use Topics    Alcohol use: Yes     Alcohol/week: 1.0 standard drink of alcohol     Types: 1 Glasses of wine per week     Comment: monthly    Drug use: Never     Review of Systems   Constitutional:  Negative for chills, fatigue and fever.   HENT:  Negative for ear pain, rhinorrhea and sore throat.     Eyes:  Negative for photophobia and pain.   Respiratory:  Positive for cough and shortness of breath. Negative for wheezing.    Cardiovascular:  Negative for chest pain.   Gastrointestinal:  Negative for abdominal pain, diarrhea, nausea and vomiting.   Genitourinary:  Negative for dysuria.   Neurological:  Negative for dizziness, weakness and headaches.   All other systems reviewed and are negative.      Physical Exam     Initial Vitals [09/14/23 0058]   BP Pulse Resp Temp SpO2   139/81 105 (!) 26 98.2 °F (36.8 °C) 97 %      MAP       --         Physical Exam    Nursing note and vitals reviewed.  Constitutional: She appears well-developed and well-nourished. No distress.   HENT:   Head: Normocephalic and atraumatic.   Eyes: Conjunctivae and EOM are normal. Pupils are equal, round, and reactive to light.   Neck: Neck supple.   Normal range of motion.  Cardiovascular:  Normal rate, regular rhythm, normal heart sounds and intact distal pulses.     Exam reveals no gallop and no friction rub.       No murmur heard.  Pulmonary/Chest: No respiratory distress. She has wheezes. She has rhonchi. She has no rales.   Abdominal: Abdomen is soft. Bowel sounds are normal. She exhibits no distension. There is no abdominal tenderness. There is no rebound and no guarding.   Musculoskeletal:         General: Normal range of motion.      Cervical back: Normal range of motion and neck supple.     Neurological: She is alert and oriented to person, place, and time.   Skin: Skin is warm and dry. Capillary refill takes less than 2 seconds. No erythema.   Psychiatric: She has a normal mood and affect. Her behavior is normal. Judgment and thought content normal.         ED Course   Procedures  Labs Reviewed   COMPREHENSIVE METABOLIC PANEL - Abnormal; Notable for the following components:       Result Value    Chloride 110 (*)     Blood Urea Nitrogen 5.1 (*)     Globulin 3.9 (*)     Albumin/Globulin Ratio 1.0 (*)     All other components  within normal limits   CBC WITH DIFFERENTIAL - Abnormal; Notable for the following components:    RBC 5.82 (*)     Hgb 10.1 (*)     Hct 34.0 (*)     MCV 58.4 (*)     MCH 17.4 (*)     MCHC 29.7 (*)     RDW 17.8 (*)     IPF 11.8 (*)     Eos # 0.95 (*)     All other components within normal limits   BLOOD SMEAR MICROSCOPIC EXAM (OLG) - Abnormal; Notable for the following components:    Anisocytosis 2+ (*)     Elliptocytosis 1+ (*)     Microcytosis 2+ (*)     All other components within normal limits   TROPONIN I - Normal   CBC W/ AUTO DIFFERENTIAL    Narrative:     The following orders were created for panel order CBC Auto Differential.  Procedure                               Abnormality         Status                     ---------                               -----------         ------                     CBC with Differential[[id number]]       Abnormal            Final result                 Please view results for these tests on the individual orders.     EKG Readings: (Independently Interpreted)   My Independent EKG Interpretation  09/14/2023 1:05 AM  Rate: 97 bpm  Rhythm: Sinus  Axis: Normal  Intervals: Normal  ST Changes: Nonspecific  Impression: Normal sinus rhythm with nonspecific ST changes           Imaging Results              X-Ray Chest 1 View (Preliminary result)  Result time 09/14/23 02:00:28      Wet Read by Hugo Magana MD (09/14/23 02:00:28, [facility], Emergency Medicine)    Diffuse fibrotic changes; no acute infiltrate appreciated.                                     Medications   magnesium sulfate 2g in water 50mL IVPB (premix) (0 g Intravenous Stopped 9/14/23 0200)   albuterol-ipratropium 2.5 mg-0.5 mg/3 mL nebulizer solution 6 mL (6 mLs Nebulization Given by Other 9/14/23 0122)   methylPREDNISolone sodium succinate injection 125 mg (125 mg Intravenous Given 9/14/23 0127)   benzonatate capsule 200 mg (200 mg Oral Given 9/14/23 0250)   LIDOcaine (PF) 20 mg/ml  (2%) injection 200 mg (200 mg Nebulization Given 9/14/23 0249)     Medical Decision Making  Patient is a 53-year-old female presents emergency room with complaints of shortness of breath and cough, moderate expiratory wheezing appreciated on physical examination with associated rhonchi.  Will obtain laboratory evaluation including CBC CMP, will also obtain a chest x-ray.  Will provide Solu-Medrol as well as 2 g of magnesium due to expiratory wheezing.    Differential diagnosis:  COPD exacerbation, community-acquired pneumonia    Amount and/or Complexity of Data Reviewed  External Data Reviewed: notes.     Details: 09/12/2023:  [facility] urgent care clinic in [place] -reviewed note:  COVID test negative prescribed Doxycycline and Pyrilamine-chlophedianol    09/08/2023: Reviewed rheumatology clinic note  Labs: ordered. Decision-making details documented in ED Course.  Radiology: ordered and independent interpretation performed.    Risk  Prescription drug management.               ED Course as of 09/14/23 0328   Thu Sep 14, 2023   0159 WBC: 7.37 [MW]   0159 Hemoglobin(!): 10.1 [MW]   0159 Hematocrit(!): 34.0 [MW]   0159 Platelets: 168 [MW]   0159 Sodium: 143 [MW]   0159 Potassium: 3.5 [MW]   0159 Chloride(!): 110 [MW]   0159 CO2: 23 [MW]   0159 Glucose: 100 [MW]   0159 Creatinine: 0.71 [MW]   0244 Patient feeling improved, but still has a cough.  No acute findings on labs.  Stable for discharge to home.  Due to persistent cough, discussed with patient about a lidocaine nebulizer treatment.  Patient agreeable.  Will give treatment, and placed on oral steroids due to expiratory wheezing. [MW]   0327 Feeling much improved following the lidocaine nebulizer treatment.  Stable for discharge. [MW]      ED Course User Index  [MW] Hugo Magana MD                    Clinical Impression:   Final diagnoses:  [J44.1] COPD exacerbation (Primary)        ED Disposition Condition    Discharge Stable          ED Prescriptions        Medication Sig Dispense Start Date End Date Auth. Provider    albuterol (PROVENTIL/VENTOLIN HFA) 90 mcg/actuation inhaler Inhale 2 puffs into the lungs every 4 to 6 hours as needed for Wheezing. 18 g 9/14/2023 -- Hugo Magana MD    methylPREDNISolone (MEDROL DOSEPACK) 4 mg tablet Take as directed on guy 1 each 9/14/2023 -- Hugo Magana MD    benzonatate (TESSALON) 100 MG capsule Take 1 capsule (100 mg total) by mouth 3 (three) times daily as needed for Cough. 20 capsule 9/14/2023 10/5/2023 Hugo Magana MD          Follow-up Information       Follow up With Specialties Details Why Contact Info    Danuta Pollack NP [facility], Emergency Medicine   [address] [place] [address] [id number] [phone]      [facility] Emergency Medicine  As needed, If symptoms worsen [address] [facility] [address] [phone]             Hugo Magana MD  09/14/23 0323

## 2023-10-02 ENCOUNTER — HOSPITAL ENCOUNTER (EMERGENCY)
Facility: HOSPITAL | Age: 53
Discharge: HOME | End: 2023-10-02
Attending: EMERGENCY MEDICINE
Payer: MEDICAID

## 2023-10-02 VITALS
RESPIRATION RATE: 18 BRPM | TEMPERATURE: 98 F | HEART RATE: 95 BPM | BODY MASS INDEX: 19.86 KG/M2 | SYSTOLIC BLOOD PRESSURE: 110 MMHG | DIASTOLIC BLOOD PRESSURE: 79 MMHG | WEIGHT: 138.75 LBS | HEIGHT: 70 IN | OXYGEN SATURATION: 96 %

## 2023-10-02 DIAGNOSIS — J44.1 COPD EXACERBATION: Primary | ICD-10-CM

## 2023-10-02 DIAGNOSIS — R07.9 CHEST PAIN: ICD-10-CM

## 2023-10-02 DIAGNOSIS — R06.02 SOB (SHORTNESS OF BREATH): ICD-10-CM

## 2023-10-02 PROCEDURE — 63600175 PHARM REV CODE 636 W HCPCS: Performed by: EMERGENCY MEDICINE

## 2023-10-02 PROCEDURE — 99284 EMERGENCY DEPT VISIT MOD MDM: CPT | Mod: 25

## 2023-10-02 PROCEDURE — 93005 ELECTROCARDIOGRAM TRACING: CPT

## 2023-10-02 PROCEDURE — 25000242 PHARM REV CODE 250 ALT 637 W/ HCPCS: Performed by: EMERGENCY MEDICINE

## 2023-10-02 PROCEDURE — 99285 EMERGENCY DEPT VISIT HI MDM: CPT | Mod: 25

## 2023-10-02 PROCEDURE — 96372 THER/PROPH/DIAG INJ SC/IM: CPT | Performed by: EMERGENCY MEDICINE

## 2023-10-02 PROCEDURE — 94640 AIRWAY INHALATION TREATMENT: CPT | Mod: XB

## 2023-10-02 RX ORDER — IPRATROPIUM BROMIDE AND ALBUTEROL SULFATE 2.5; .5 MG/3ML; MG/3ML
3 SOLUTION RESPIRATORY (INHALATION)
Status: COMPLETED | OUTPATIENT
Start: 2023-10-02 | End: 2023-10-02

## 2023-10-02 RX ORDER — PREDNISONE 20 MG/1
40 TABLET ORAL DAILY
Qty: 14 TABLET | Refills: 0 | Status: SHIPPED | OUTPATIENT
Start: 2023-10-02 | End: 2023-10-09

## 2023-10-02 RX ORDER — DEXAMETHASONE SODIUM PHOSPHATE 4 MG/ML
8 INJECTION, SOLUTION INTRA-ARTICULAR; INTRALESIONAL; INTRAMUSCULAR; INTRAVENOUS; SOFT TISSUE
Status: COMPLETED | OUTPATIENT
Start: 2023-10-02 | End: 2023-10-02

## 2023-10-02 RX ADMIN — DEXAMETHASONE SODIUM PHOSPHATE 8 MG: 4 INJECTION, SOLUTION INTRA-ARTICULAR; INTRALESIONAL; INTRAMUSCULAR; INTRAVENOUS; SOFT TISSUE at 01:10

## 2023-10-02 RX ADMIN — IPRATROPIUM BROMIDE AND ALBUTEROL SULFATE 3 ML: 2.5; .5 SOLUTION RESPIRATORY (INHALATION) at 01:10

## 2023-10-02 NOTE — ED PROVIDER NOTES
"Encounter Date: 10/2/2023       History     Chief Complaint   Patient presents with    Shortness of Breath     Sent from PCP for "steroids". C/o SOB and chest tightness, wheezing that started last night. Hx of COPD reported.      COPD patient, never a smoker, followed by Pulmonary in [place].  Last course of steroids about 3 weeks ago.  Short of breath, chest tight, cough, wheezing onset last night again like usual, not out of any of her usual medications including handheld and nebulized bronchodilators.  No chest pain, fever, chills, or other complaints.  Recently completed a course of Zithromax.  Tested this morning negative for COVID and strep.  Not hospitalized previously for COPD.  Feels mildly weak and drained of energy.  No other complaints.  Seen at primary care this morning and given 1 nebulizer treatment but advised to proceed to the ER.    The history is provided by the patient. No  was used.     Review of patient's allergies indicates:  No Known Allergies  Past Medical History:   Diagnosis Date    Arthritis     COPD (chronic obstructive pulmonary disease)      Past Surgical History:   Procedure Laterality Date     SECTION       Family History   Problem Relation Age of Onset    Heart disease Mother         My mom have a pace maker    Colon cancer Father     Stomach cancer Father     Breast cancer Sister     Arthritis Maternal Grandmother         My mom mom had arthritis    Heart disease Maternal Grandmother         She had a bad heart    Cancer Sister         My sister had breast cancer she had both of them remove    Cancer Maternal Aunt         My mom sister had breast cancer she had one remove     Social History     Tobacco Use    Smoking status: Never     Passive exposure: Never    Smokeless tobacco: Never   Substance Use Topics    Alcohol use: Yes     Alcohol/week: 1.0 standard drink of alcohol     Types: 1 Glasses of wine per week     Comment: monthly    Drug use: Never "     Review of Systems   Constitutional:  Negative for activity change, fatigue and fever.   HENT:  Negative for congestion, ear pain, facial swelling, nosebleeds, sinus pressure and sore throat.    Eyes:  Negative for pain, discharge, redness and visual disturbance.   Respiratory:  Positive for cough and wheezing. Negative for choking, chest tightness and shortness of breath.    Cardiovascular:  Negative for chest pain, palpitations and leg swelling.   Gastrointestinal:  Negative for abdominal distention, abdominal pain, nausea and vomiting.   Endocrine: Negative for heat intolerance, polydipsia and polyuria.   Genitourinary:  Negative for difficulty urinating, dysuria, flank pain, hematuria and urgency.   Musculoskeletal:  Negative for back pain, gait problem, joint swelling and myalgias.   Skin:  Negative for color change and rash.   Allergic/Immunologic: Negative for environmental allergies and food allergies.   Neurological:  Negative for dizziness, weakness, numbness and headaches.   Hematological:  Negative for adenopathy. Does not bruise/bleed easily.   Psychiatric/Behavioral:  Negative for agitation and behavioral problems. The patient is not nervous/anxious.    All other systems reviewed and are negative.      Physical Exam     Initial Vitals [10/02/23 1256]   BP Pulse Resp Temp SpO2   129/84 105 20 98.2 °F (36.8 °C) 98 %      MAP       --         Physical Exam    Nursing note and vitals reviewed.  Constitutional: She appears well-developed and well-nourished. She is not diaphoretic. No distress.   HENT:   Head: Normocephalic and atraumatic.   Mouth/Throat: No oropharyngeal exudate.   Eyes: Conjunctivae and EOM are normal. Pupils are equal, round, and reactive to light. Right eye exhibits no discharge. Left eye exhibits no discharge. No scleral icterus.   Neck: Neck supple. No thyromegaly present. No tracheal deviation present. No JVD present.   Normal range of motion.  Cardiovascular:  Normal rate, regular  rhythm, normal heart sounds and intact distal pulses.     Exam reveals no gallop and no friction rub.       No murmur heard.  Pulmonary/Chest: No stridor. No respiratory distress. She has wheezes. She has no rhonchi. She has no rales. She exhibits no tenderness.   Moderate diffuse expiratory wheezing without carly dyspnea or tachypnea.  Slightly prolonged expiratory phase.   Abdominal: Abdomen is soft. Bowel sounds are normal. She exhibits no distension and no mass. There is no abdominal tenderness. There is no rebound and no guarding.   Musculoskeletal:         General: No tenderness or edema. Normal range of motion.      Cervical back: Normal range of motion and neck supple.     Neurological: She is alert and oriented to person, place, and time. She has normal strength.   Skin: Skin is warm and dry. No rash and no abscess noted. No erythema.   Psychiatric: She has a normal mood and affect. Her behavior is normal. Judgment and thought content normal.         ED Course   Procedures  Labs Reviewed - No data to display  EKG Readings: (Independently Interpreted)   Initial Reading: No STEMI. Rhythm: Normal Sinus Rhythm. Heart Rate: 99.   Normal sinus rhythm at 99 beats per minute, possible old anterior infarction, nonspecific lateral T-wave changes. No change from recent.     ECG Results              EKG 12-lead (Chest Pain) Age >30 (In process)  Result time 10/02/23 14:05:16      In process by Interface, Lab In [facility] (10/02/23 14:05:16)                   Narrative:    Test Reason : R07.9,    Vent. Rate : 099 BPM     Atrial Rate : 099 BPM     P-R Int : 142 ms          QRS Dur : 078 ms      QT Int : 362 ms       P-R-T Axes : 064 007 017 degrees     QTc Int : 464 ms    Normal sinus rhythm  Cannot rule out Anterior infarct (cited on or before 14-SEP-2023)  T wave abnormality, consider lateral ischemia  Abnormal ECG  When compared with ECG of 14-SEP-2023 01:05,  No significant change was found    Referred By: JOSÉ LUIS    SELF           Confirmed By:                                   Imaging Results              X-Ray Chest AP Portable (Final result)  Result time 10/02/23 13:58:53      Final result by Gigi Douglass MD (10/02/23 13:58:53)                   Impression:      No acute findings identified.      Electronically signed by: Gigi Douglass  Date:    10/02/2023  Time:    13:58               Narrative:    EXAMINATION:  XR CHEST AP PORTABLE    CLINICAL HISTORY:  Shortness of breath    TECHNIQUE:  One    COMPARISON:  September 14, 2023    FINDINGS:  Cardiopericardial silhouette is within upper limits of normal.  There chronic appearing interstitial changes of lungs reflective of fibrosis without significant interval change.  No significant superimposed congestive process, acute consolidation, fluid within pleural spaces and there is no pneumothorax.                                       Medications   albuterol-ipratropium 2.5 mg-0.5 mg/3 mL nebulizer solution 3 mL (3 mLs Nebulization Given 10/2/23 0527)   dexAMETHasone injection 8 mg (8 mg Intramuscular Given 10/2/23 1342)       3:18 PM Moderate residual wheezing but comfortable at rest, feels well enough to go home and continue routine outpatient management.  She has all current supplies for her bronchodilators, encouraged to use her nebulizer on a regular basis.  Seven day course of steroids and follow-up with Pulmonary and primary care.    Medical Decision Making  Problems Addressed:  COPD exacerbation: acute illness or injury    Amount and/or Complexity of Data Reviewed  Radiology: ordered. Decision-making details documented in ED Course.  ECG/medicine tests: ordered and independent interpretation performed. Decision-making details documented in ED Course.    Risk  Prescription drug management.  Decision regarding hospitalization.      Additional MDM:   Differential Diagnosis:   COPD exacerbation/ pneumonia                             Clinical Impression:   Final  diagnoses:  [R07.9] Chest pain  [R06.02] SOB (shortness of breath)  [J44.1] COPD exacerbation (Primary)        ED Disposition Condition    Discharge Stable          ED Prescriptions       Medication Sig Dispense Start Date End Date Auth. Provider    predniSONE (DELTASONE) 20 MG tablet Take 2 tablets (40 mg total) by mouth once daily. for 7 days 14 tablet 10/2/2023 10/9/2023 Issa Celis MD          Follow-up Information       Follow up With Specialties Details Why Contact Info    [facility] Emergency Medicine  As needed [address] [facility] [address] [phone]    Danuta Pollack NP Urgent Care, Emergency Medicine In 2 days  [address] [facility] [address]  [phone]               Issa Celis MD  10/02/23 2153

## 2023-10-02 NOTE — DISCHARGE INSTRUCTIONS
Chest x-ray is fine.      Use your home nebulizer every 3 or 4 hours for the next 2 or 3 days and touch base with your pulmonologist soon.      Return if needed or if worse.

## 2023-10-30 ENCOUNTER — HOSPITAL ENCOUNTER (EMERGENCY)
Facility: HOSPITAL | Age: 53
Discharge: HOME | End: 2023-10-30
Attending: INTERNAL MEDICINE
Payer: MEDICAID

## 2023-10-30 VITALS
RESPIRATION RATE: 18 BRPM | SYSTOLIC BLOOD PRESSURE: 112 MMHG | TEMPERATURE: 98 F | WEIGHT: 136.81 LBS | HEART RATE: 94 BPM | BODY MASS INDEX: 19.59 KG/M2 | DIASTOLIC BLOOD PRESSURE: 68 MMHG | HEIGHT: 70 IN | OXYGEN SATURATION: 98 %

## 2023-10-30 DIAGNOSIS — J44.1 COPD EXACERBATION: Primary | ICD-10-CM

## 2023-10-30 LAB
ALBUMIN SERPL-MCNC: 3.9 G/DL (ref 3.5–5)
ALBUMIN/GLOB SERPL: 1 RATIO (ref 1.1–2)
ALP SERPL-CCNC: 98 UNIT/L (ref 40–150)
ALT SERPL-CCNC: 18 UNIT/L (ref 0–55)
ANISOCYTOSIS BLD QL SMEAR: ABNORMAL
AST SERPL-CCNC: 19 UNIT/L (ref 5–34)
BASOPHILS # BLD AUTO: 0.08 X10(3)/MCL
BASOPHILS NFR BLD AUTO: 1 %
BILIRUB SERPL-MCNC: 0.6 MG/DL
BNP BLD-MCNC: <10 PG/ML
BUN SERPL-MCNC: 5.4 MG/DL (ref 9.8–20.1)
CALCIUM SERPL-MCNC: 9.8 MG/DL (ref 8.4–10.2)
CHLORIDE SERPL-SCNC: 107 MMOL/L (ref 98–107)
CO2 SERPL-SCNC: 24 MMOL/L (ref 22–29)
CREAT SERPL-MCNC: 0.76 MG/DL (ref 0.55–1.02)
ELLIPTOCYTOSIS (OHS): ABNORMAL
EOSINOPHIL # BLD AUTO: 1.04 X10(3)/MCL (ref 0–0.9)
EOSINOPHIL NFR BLD AUTO: 13.3 %
ERYTHROCYTE [DISTWIDTH] IN BLOOD BY AUTOMATED COUNT: 18.7 % (ref 11.5–17)
FLUAV AG UPPER RESP QL IA.RAPID: NOT DETECTED
FLUBV AG UPPER RESP QL IA.RAPID: NOT DETECTED
GFR SERPLBLD CREATININE-BSD FMLA CKD-EPI: >60 MLS/MIN/1.73/M2
GLOBULIN SER-MCNC: 3.9 GM/DL (ref 2.4–3.5)
GLUCOSE SERPL-MCNC: 73 MG/DL (ref 74–100)
HCT VFR BLD AUTO: 37 % (ref 37–47)
HGB BLD-MCNC: 11 G/DL (ref 12–16)
HYPOCHROMIA BLD QL SMEAR: ABNORMAL
IMM GRANULOCYTES # BLD AUTO: 0.03 X10(3)/MCL (ref 0–0.04)
IMM GRANULOCYTES NFR BLD AUTO: 0.4 %
LYMPHOCYTES # BLD AUTO: 1.61 X10(3)/MCL (ref 0.6–4.6)
LYMPHOCYTES NFR BLD AUTO: 20.6 %
MCH RBC QN AUTO: 17.5 PG (ref 27–31)
MCHC RBC AUTO-ENTMCNC: 29.7 G/DL (ref 33–36)
MCV RBC AUTO: 58.9 FL (ref 80–94)
MONOCYTES # BLD AUTO: 0.51 X10(3)/MCL (ref 0.1–1.3)
MONOCYTES NFR BLD AUTO: 6.5 %
NEUTROPHILS # BLD AUTO: 4.53 X10(3)/MCL (ref 2.1–9.2)
NEUTROPHILS NFR BLD AUTO: 58.2 %
NRBC BLD AUTO-RTO: 0 %
PLATELET # BLD AUTO: 207 X10(3)/MCL (ref 130–400)
PLATELET # BLD EST: ADEQUATE 10*3/UL
PLATELETS.RETICULATED NFR BLD AUTO: 13.5 % (ref 0.9–11.2)
PMV BLD AUTO: ABNORMAL FL
POTASSIUM SERPL-SCNC: 3.5 MMOL/L (ref 3.5–5.1)
PROT SERPL-MCNC: 7.8 GM/DL (ref 6.4–8.3)
RBC # BLD AUTO: 6.28 X10(6)/MCL (ref 4.2–5.4)
RBC MORPH BLD: ABNORMAL
SARS-COV-2 RNA RESP QL NAA+PROBE: NOT DETECTED
SODIUM SERPL-SCNC: 141 MMOL/L (ref 136–145)
TROPONIN I SERPL-MCNC: <0.01 NG/ML (ref 0–0.04)
WBC # SPEC AUTO: 7.8 X10(3)/MCL (ref 4.5–11.5)

## 2023-10-30 PROCEDURE — 85025 COMPLETE CBC W/AUTO DIFF WBC: CPT | Performed by: PHYSICIAN ASSISTANT

## 2023-10-30 PROCEDURE — 80053 COMPREHEN METABOLIC PANEL: CPT | Performed by: PHYSICIAN ASSISTANT

## 2023-10-30 PROCEDURE — 93005 ELECTROCARDIOGRAM TRACING: CPT

## 2023-10-30 PROCEDURE — 0240U COVID/FLU A&B PCR: CPT | Performed by: PHYSICIAN ASSISTANT

## 2023-10-30 PROCEDURE — 25000242 PHARM REV CODE 250 ALT 637 W/ HCPCS: Performed by: PHYSICIAN ASSISTANT

## 2023-10-30 PROCEDURE — 96372 THER/PROPH/DIAG INJ SC/IM: CPT | Performed by: PHYSICIAN ASSISTANT

## 2023-10-30 PROCEDURE — 94640 AIRWAY INHALATION TREATMENT: CPT

## 2023-10-30 PROCEDURE — 83880 ASSAY OF NATRIURETIC PEPTIDE: CPT | Performed by: PHYSICIAN ASSISTANT

## 2023-10-30 PROCEDURE — 63600175 PHARM REV CODE 636 W HCPCS: Performed by: PHYSICIAN ASSISTANT

## 2023-10-30 PROCEDURE — 99285 EMERGENCY DEPT VISIT HI MDM: CPT | Mod: 25

## 2023-10-30 PROCEDURE — 84484 ASSAY OF TROPONIN QUANT: CPT | Performed by: PHYSICIAN ASSISTANT

## 2023-10-30 RX ORDER — DEXAMETHASONE SODIUM PHOSPHATE 4 MG/ML
8 INJECTION, SOLUTION INTRA-ARTICULAR; INTRALESIONAL; INTRAMUSCULAR; INTRAVENOUS; SOFT TISSUE
Status: COMPLETED | OUTPATIENT
Start: 2023-10-30 | End: 2023-10-30

## 2023-10-30 RX ORDER — METHYLPREDNISOLONE 4 MG/1
TABLET ORAL
Qty: 21 EACH | Refills: 0 | Status: SHIPPED | OUTPATIENT
Start: 2023-10-30 | End: 2023-11-20

## 2023-10-30 RX ORDER — IPRATROPIUM BROMIDE AND ALBUTEROL SULFATE 2.5; .5 MG/3ML; MG/3ML
3 SOLUTION RESPIRATORY (INHALATION)
Status: COMPLETED | OUTPATIENT
Start: 2023-10-30 | End: 2023-10-30

## 2023-10-30 RX ADMIN — IPRATROPIUM BROMIDE AND ALBUTEROL SULFATE 3 ML: .5; 3 SOLUTION RESPIRATORY (INHALATION) at 09:10

## 2023-10-30 RX ADMIN — DEXAMETHASONE SODIUM PHOSPHATE 8 MG: 4 INJECTION, SOLUTION INTRA-ARTICULAR; INTRALESIONAL; INTRAMUSCULAR; INTRAVENOUS; SOFT TISSUE at 09:10

## 2023-10-30 RX ADMIN — IPRATROPIUM BROMIDE AND ALBUTEROL SULFATE 3 ML: .5; 3 SOLUTION RESPIRATORY (INHALATION) at 10:10

## 2023-10-30 RX ADMIN — IPRATROPIUM BROMIDE AND ALBUTEROL SULFATE 3 ML: .5; 3 SOLUTION RESPIRATORY (INHALATION) at 11:10

## 2023-10-30 NOTE — FIRST PROVIDER EVALUATION
"Medical screening examination initiated.  I have conducted a focused provider triage encounter, findings are as follows:    Brief history of present illness:  Patient with hx of COPD presents today with shortness of breath, chest pain, and wheezing since yesterday.    Vitals:    10/30/23 0911   BP: 112/68   Pulse: 100   Resp: (!) 22   Temp: 98.2 °F (36.8 °C)   TempSrc: Oral   SpO2: 99%   Weight: 62.1 kg (136 lb 12.7 oz)   Height: 5' 10" (1.778 m)       Pertinent physical exam:  Vitals stable. Expiratory wheezes in all lung fields.     Brief workup plan:  EKG, labs, CXR    Preliminary workup initiated; this workup will be continued and followed by the physician or advanced practice provider that is assigned to the patient when roomed.  " How Severe Are Your Spot(S)?: mild What Is The Reason For Today's Visit?: Upper Body Skin Exam

## 2023-10-30 NOTE — ED PROVIDER NOTES
Encounter Date: 10/30/2023       History     Chief Complaint   Patient presents with    Shortness of Breath     Pt c/o sob and wheezing since yesterday morning. Pt has a hx of COPD. No relief from home medications.     Patient reports to the emergency room with complaints of shortness of breath and wheezing starting yesterday consistent with previous COPD; patient denies any fever, or sick contact    The history is provided by the patient.   Shortness of Breath  This is a chronic problem. The average episode lasts 1 day. The problem occurs intermittently.The current episode started yesterday. Associated symptoms include cough, sputum production and wheezing. Pertinent negatives include no fever, no headaches, no sore throat, no chest pain, no rash, no leg pain and no leg swelling. The problem's precipitants include weather changes. The treatment provided no relief. She has had Prior ED visits. Associated medical issues include COPD.     Review of patient's allergies indicates:  No Known Allergies  Past Medical History:   Diagnosis Date    Arthritis     COPD (chronic obstructive pulmonary disease)      Past Surgical History:   Procedure Laterality Date     SECTION       Family History   Problem Relation Age of Onset    Heart disease Mother         My mom have a pace maker    Colon cancer Father     Stomach cancer Father     Breast cancer Sister     Arthritis Maternal Grandmother         My mom mom had arthritis    Heart disease Maternal Grandmother         She had a bad heart    Cancer Sister         My sister had breast cancer she had both of them remove    Cancer Maternal Aunt         My mom sister had breast cancer she had one remove     Social History     Tobacco Use    Smoking status: Never     Passive exposure: Never    Smokeless tobacco: Never   Substance Use Topics    Alcohol use: Yes     Alcohol/week: 1.0 standard drink of alcohol     Types: 1 Glasses of wine per week     Comment: monthly    Drug  use: Never     Review of Systems   Constitutional:  Negative for fever.   HENT:  Negative for sore throat.    Eyes: Negative.    Respiratory:  Positive for cough, sputum production, shortness of breath and wheezing.    Cardiovascular:  Negative for chest pain and leg swelling.   Gastrointestinal:  Negative for nausea.   Genitourinary:  Negative for dysuria.   Musculoskeletal:  Negative for back pain.   Skin:  Negative for rash.   Neurological:  Negative for weakness and headaches.   Hematological:  Does not bruise/bleed easily.   Psychiatric/Behavioral: Negative.         Physical Exam     Initial Vitals [10/30/23 0911]   BP Pulse Resp Temp SpO2   112/68 100 (!) 22 98.2 °F (36.8 °C) 99 %      MAP       --         Physical Exam    Vitals reviewed.  Constitutional: Vital signs are normal. She appears well-developed and well-nourished.   HENT:   Head: Normocephalic and atraumatic.   Eyes: Conjunctivae and EOM are normal. Pupils are equal, round, and reactive to light.   Neck: Neck supple.   Normal range of motion.  Cardiovascular:  Normal rate, regular rhythm, normal heart sounds and intact distal pulses.           Pulmonary/Chest: No respiratory distress. She has wheezes. She exhibits no tenderness.   Abdominal: Abdomen is soft. Bowel sounds are normal. There is no abdominal tenderness.   Musculoskeletal:         General: Normal range of motion.      Cervical back: Normal range of motion and neck supple.     Neurological: She is alert and oriented to person, place, and time. She displays normal reflexes. No cranial nerve deficit or sensory deficit.   Skin: Skin is warm and dry.   Psychiatric: She has a normal mood and affect. Her behavior is normal. Judgment and thought content normal.         ED Course   Procedures  Labs Reviewed   COMPREHENSIVE METABOLIC PANEL - Abnormal; Notable for the following components:       Result Value    Glucose Level 73 (*)     Blood Urea Nitrogen 5.4 (*)     Globulin 3.9 (*)      Albumin/Globulin Ratio 1.0 (*)     All other components within normal limits   CBC WITH DIFFERENTIAL - Abnormal; Notable for the following components:    RBC 6.28 (*)     Hgb 11.0 (*)     MCV 58.9 (*)     MCH 17.5 (*)     MCHC 29.7 (*)     RDW 18.7 (*)     IPF 13.5 (*)     Eos # 1.04 (*)     All other components within normal limits   BLOOD SMEAR MICROSCOPIC EXAM (OLG) - Abnormal; Notable for the following components:    RBC Morph Abnormal (*)     Anisocytosis 2+ (*)     Elliptocytosis 1+ (*)     Hypochromasia 1+ (*)     All other components within normal limits   COVID/FLU A&B PCR - Normal    Narrative:     The Xpert Xpress SARS-CoV-2/FLU/RSV plus is a rapid, multiplexed real-time PCR test intended for the simultaneous qualitative detection and differentiation of SARS-CoV-2, Influenza A, Influenza B, and respiratory syncytial virus (RSV) viral RNA in either nasopharyngeal swab or nasal swab specimens.         TROPONIN I - Normal   B-TYPE NATRIURETIC PEPTIDE - Normal   CBC W/ AUTO DIFFERENTIAL    Narrative:     The following orders were created for panel order CBC auto differential.  Procedure                               Abnormality         Status                     ---------                               -----------         ------                     CBC with Differential[[id number]]       Abnormal            Final result                 Please view results for these tests on the individual orders.   EXTRA TUBES    Narrative:     The following orders were created for panel order EXTRA TUBES.  Procedure                               Abnormality         Status                     ---------                               -----------         ------                     Light Blue Top Hold[[id number]]                             In process                 Gold Top Hold[[id number]]                                   In process                 Pink Top Hold[[id number]]                                   In process                    Please view results for these tests on the individual orders.   LIGHT BLUE TOP HOLD   GOLD TOP HOLD   PINK TOP HOLD     EKG Readings: (Independently Interpreted)   Initial Reading: No STEMI. Rhythm: Normal Sinus Rhythm. Heart Rate: 96. Ectopy: No Ectopy. Conduction: Normal. ST Segments: Normal ST Segments. T Waves: Normal. Clinical Impression: Normal Sinus Rhythm     ECG Results              EKG 12-lead (In process)  Result time 10/30/23 11:00:42      In process by Interface, Lab In [facility] (10/30/23 11:00:42)                   Narrative:    Test Reason : R07.9,    Vent. Rate : 096 BPM     Atrial Rate : 096 BPM     P-R Int : 132 ms          QRS Dur : 086 ms      QT Int : 358 ms       P-R-T Axes : 001 -01 038 degrees     QTc Int : 452 ms    Normal sinus rhythm  Cannot rule out Anterior infarct (cited on or before 14-SEP-2023)  Abnormal ECG  When compared with ECG of 02-OCT-2023 13:11,  No significant change was found    Referred By: [email or web address]   SELF           Confirmed By:                                   Imaging Results              X-Ray Chest PA And Lateral (Final result)  Result time 10/30/23 11:31:10      Final result by Castro Winslow MD (10/30/23 11:31:10)                   Impression:      No acute cardiopulmonary process.  Findings of chronic lung disease.      Electronically signed by: Castro Winslow  Date:    10/30/2023  Time:    11:31               Narrative:    EXAMINATION:  XR CHEST PA AND LATERAL    CLINICAL HISTORY:  shortness of breath;    TECHNIQUE:  Two views of the chest    COMPARISON:  10/02/2023    FINDINGS:  Prominent interstitial markings throughout with no focal opacification.    The cardiomediastinal silhouette is within normal limits.    No acute osseous abnormality.                                       Medications   albuterol-ipratropium 2.5 mg-0.5 mg/3 mL nebulizer solution 3 mL (3 mLs Nebulization Given 10/30/23 1000)   dexAMETHasone injection 8 mg (8 mg Intramuscular  Given 10/30/23 0958)   albuterol-ipratropium 2.5 mg-0.5 mg/3 mL nebulizer solution 3 mL (3 mLs Nebulization Given 10/30/23 0346)     Medical Decision Making  Patient reports significant improvement of wheezing and shortness of breath symptoms after her breathing treatment; she will discuss long-term treatment options with her PCP given the recent weather changes    Risk  Prescription drug management.  Risk Details: Given strict ED return precautions. I have spoken with the patient and/or caregivers. I have explained the patient's condition, diagnoses and treatment plan based on the information available to me at this time. I have answered the patient's and/or caregiver's questions and addressed any concerns. The patient and/or caregivers have as good an understanding of the patient's diagnosis, condition and treatment plan as can be expected at this point. The vital signs have been stable. The patient's condition is stable and appropriate for discharge from the emergency department.      The patient will pursue further outpatient evaluation with the primary care physician or other designated or consulting physician as outlined in the discharge instructions. The patient and/or caregivers are agreeable to this plan of care and follow-up instructions have been explained in detail. The patient and/or caregivers have received these instructions in written format and have expressed an understanding of the discharge instructions. The patient and/or caregivers are aware that any significant change in condition or worsening of symptoms should prompt an immediate return to this or the closest emergency department or a call to 911.                               Clinical Impression:   Final diagnoses:  [J44.1] COPD exacerbation (Primary)        ED Disposition Condition    Discharge Stable          ED Prescriptions       Medication Sig Dispense Start Date End Date Auth. Provider    methylPREDNISolone (MEDROL DOSEPACK) 4 mg  tablet use as directed 21 each 10/30/2023 11/20/2023 Yang Fisher PA          Follow-up Information       Follow up With Specialties Details Why Contact Info    Coleman, Shonetelle B, NP Pediatrics   [address] [place] [address]  [facility] [place] [address]  [phone]      discharge followup    If your symptoms become WORSE or you DO NOT IMPROVE and you are unable to reach your health care provider, you should RETURN to the emergency department    discharge info    Discussed all pertinent ED information, results, diagnosis and treatment plan; All questions and concerns were addressed at this time. Patient voices understanding of information and instructions. Patient is comfortable with plan and discharge             Yang Fisher PA  10/30/23 1210

## 2023-11-14 ENCOUNTER — HOSPITAL ENCOUNTER (EMERGENCY)
Facility: HOSPITAL | Age: 53
Discharge: HOME | End: 2023-11-14
Attending: EMERGENCY MEDICINE
Payer: MEDICAID

## 2023-11-14 VITALS
BODY MASS INDEX: 20.01 KG/M2 | TEMPERATURE: 98 F | WEIGHT: 139.75 LBS | SYSTOLIC BLOOD PRESSURE: 125 MMHG | RESPIRATION RATE: 22 BRPM | HEART RATE: 88 BPM | OXYGEN SATURATION: 99 % | HEIGHT: 70 IN | DIASTOLIC BLOOD PRESSURE: 77 MMHG

## 2023-11-14 DIAGNOSIS — J44.1 CHRONIC OBSTRUCTIVE PULMONARY DISEASE WITH ACUTE EXACERBATION: Primary | ICD-10-CM

## 2023-11-14 LAB
ANION GAP SERPL CALC-SCNC: 10 MEQ/L
BASOPHILS # BLD AUTO: 0.04 X10(3)/MCL
BASOPHILS NFR BLD AUTO: 0.5 %
BNP BLD-MCNC: 13.4 PG/ML
BUN SERPL-MCNC: 5.6 MG/DL (ref 9.8–20.1)
CALCIUM SERPL-MCNC: 9.6 MG/DL (ref 8.4–10.2)
CHLORIDE SERPL-SCNC: 107 MMOL/L (ref 98–107)
CO2 SERPL-SCNC: 25 MMOL/L (ref 22–29)
CREAT SERPL-MCNC: 0.65 MG/DL (ref 0.55–1.02)
CREAT/UREA NIT SERPL: 9
EOSINOPHIL # BLD AUTO: 1.3 X10(3)/MCL (ref 0–0.9)
EOSINOPHIL NFR BLD AUTO: 17.4 %
ERYTHROCYTE [DISTWIDTH] IN BLOOD BY AUTOMATED COUNT: 17.8 % (ref 11.5–17)
FLUAV AG UPPER RESP QL IA.RAPID: NOT DETECTED
FLUBV AG UPPER RESP QL IA.RAPID: NOT DETECTED
GFR SERPLBLD CREATININE-BSD FMLA CKD-EPI: >60 MLS/MIN/1.73/M2
GLUCOSE SERPL-MCNC: 110 MG/DL (ref 74–100)
HCT VFR BLD AUTO: 33.3 % (ref 37–47)
HGB BLD-MCNC: 9.9 G/DL (ref 12–16)
IMM GRANULOCYTES # BLD AUTO: 0.02 X10(3)/MCL (ref 0–0.04)
IMM GRANULOCYTES NFR BLD AUTO: 0.3 %
LYMPHOCYTES # BLD AUTO: 1.72 X10(3)/MCL (ref 0.6–4.6)
LYMPHOCYTES NFR BLD AUTO: 23.1 %
MCH RBC QN AUTO: 17.5 PG (ref 27–31)
MCHC RBC AUTO-ENTMCNC: 29.7 G/DL (ref 33–36)
MCV RBC AUTO: 58.9 FL (ref 80–94)
MONOCYTES # BLD AUTO: 0.44 X10(3)/MCL (ref 0.1–1.3)
MONOCYTES NFR BLD AUTO: 5.9 %
NEUTROPHILS # BLD AUTO: 3.93 X10(3)/MCL (ref 2.1–9.2)
NEUTROPHILS NFR BLD AUTO: 52.8 %
NRBC BLD AUTO-RTO: 0 %
PLATELET # BLD AUTO: 129 X10(3)/MCL (ref 130–400)
PLATELETS.RETICULATED NFR BLD AUTO: 11.3 % (ref 0.9–11.2)
PMV BLD AUTO: ABNORMAL FL
POTASSIUM SERPL-SCNC: 3.5 MMOL/L (ref 3.5–5.1)
RBC # BLD AUTO: 5.65 X10(6)/MCL (ref 4.2–5.4)
RSV A 5' UTR RNA NPH QL NAA+PROBE: NOT DETECTED
SARS-COV-2 RNA RESP QL NAA+PROBE: NOT DETECTED
SODIUM SERPL-SCNC: 142 MMOL/L (ref 136–145)
WBC # SPEC AUTO: 7.45 X10(3)/MCL (ref 4.5–11.5)

## 2023-11-14 PROCEDURE — 85025 COMPLETE CBC W/AUTO DIFF WBC: CPT | Performed by: EMERGENCY MEDICINE

## 2023-11-14 PROCEDURE — 25000242 PHARM REV CODE 250 ALT 637 W/ HCPCS: Performed by: EMERGENCY MEDICINE

## 2023-11-14 PROCEDURE — 99284 EMERGENCY DEPT VISIT MOD MDM: CPT | Mod: 25

## 2023-11-14 PROCEDURE — 80048 BASIC METABOLIC PNL TOTAL CA: CPT | Performed by: EMERGENCY MEDICINE

## 2023-11-14 PROCEDURE — 83880 ASSAY OF NATRIURETIC PEPTIDE: CPT | Performed by: EMERGENCY MEDICINE

## 2023-11-14 PROCEDURE — 0241U COVID/RSV/FLU A&B PCR: CPT | Performed by: EMERGENCY MEDICINE

## 2023-11-14 PROCEDURE — 96372 THER/PROPH/DIAG INJ SC/IM: CPT | Performed by: EMERGENCY MEDICINE

## 2023-11-14 PROCEDURE — 63600175 PHARM REV CODE 636 W HCPCS: Performed by: EMERGENCY MEDICINE

## 2023-11-14 RX ORDER — AMOXICILLIN AND CLAVULANATE POTASSIUM 875; 125 MG/1; MG/1
1 TABLET, FILM COATED ORAL 2 TIMES DAILY
Qty: 14 TABLET | Refills: 0 | Status: SHIPPED | OUTPATIENT
Start: 2023-11-14 | End: 2024-03-14

## 2023-11-14 RX ORDER — PREDNISONE 20 MG/1
60 TABLET ORAL DAILY
Qty: 15 TABLET | Refills: 0 | Status: SHIPPED | OUTPATIENT
Start: 2023-11-14 | End: 2023-11-19

## 2023-11-14 RX ORDER — IPRATROPIUM BROMIDE AND ALBUTEROL SULFATE 2.5; .5 MG/3ML; MG/3ML
9 SOLUTION RESPIRATORY (INHALATION)
Status: COMPLETED | OUTPATIENT
Start: 2023-11-14 | End: 2023-11-14

## 2023-11-14 RX ORDER — KETOROLAC TROMETHAMINE 30 MG/ML
15 INJECTION, SOLUTION INTRAMUSCULAR; INTRAVENOUS
Status: COMPLETED | OUTPATIENT
Start: 2023-11-14 | End: 2023-11-14

## 2023-11-14 RX ADMIN — KETOROLAC TROMETHAMINE 15 MG: 30 INJECTION, SOLUTION INTRAMUSCULAR; INTRAVENOUS at 04:11

## 2023-11-14 RX ADMIN — IPRATROPIUM BROMIDE AND ALBUTEROL SULFATE 9 ML: .5; 3 SOLUTION RESPIRATORY (INHALATION) at 04:11

## 2023-11-14 RX ADMIN — PREDNISONE 60 MG: 50 TABLET ORAL at 04:11

## 2023-11-14 NOTE — ED PROVIDER NOTES
ED PROVIDER NOTE  2023    CHIEF COMPLAINT:   Chief Complaint   Patient presents with    Shortness of Breath    Cough    Back Pain       HISTORY OF PRESENT ILLNESS:   Sarah Saravia is a 53 y.o. female who presents with chief complaint Shortness of breath.  Onset was couple of days ago whenever she began having progressively worsening shortness of breath aggravated with activity and temporarily relieved with use of her inhaler.  Associated symptoms of chest tightness and cough productive of thick whitish sputum.  She reports that she went to her PCP yesterday morning and got a DuoNeb treatment and felt much better and was prescribed some steroids but had not been able to pick this medication up.  Denies having any fevers.    The history is provided by the patient.         REVIEW OF SYSTEMS: as noted in the HPI.  NURSING NOTES REVIEWED      PAST MEDICAL/SURGICAL HISTORY:   Past Medical History:   Diagnosis Date    Arthritis     COPD (chronic obstructive pulmonary disease)       Past Surgical History:   Procedure Laterality Date     SECTION         FAMILY HISTORY:   Family History   Problem Relation Age of Onset    Heart disease Mother         My mom have a pace maker    Colon cancer Father     Stomach cancer Father     Breast cancer Sister     Arthritis Maternal Grandmother         My mom mom had arthritis    Heart disease Maternal Grandmother         She had a bad heart    Cancer Sister         My sister had breast cancer she had both of them remove    Cancer Maternal Aunt         My mom sister had breast cancer she had one remove       SOCIAL HISTORY:   Social History     Tobacco Use    Smoking status: Never     Passive exposure: Never    Smokeless tobacco: Never   Substance Use Topics    Alcohol use: Yes     Alcohol/week: 1.0 standard drink of alcohol     Types: 1 Glasses of wine per week     Comment: monthly    Drug use: Never       ALLERGIES: Review of patient's allergies indicates:  No Known  Allergies    PHYSICAL EXAM:  Initial Vitals   BP Pulse Resp Temp SpO2   11/14/23 0400 11/14/23 0400 11/14/23 0344 11/14/23 0344 11/14/23 0400   121/83 89 (!) 26 98 °F (36.7 °C) (!) 94 %      MAP       --                Physical Exam    Nursing note and vitals reviewed.  Constitutional: She appears well-developed and well-nourished. She appears distressed.   HENT:   Head: Normocephalic and atraumatic.   Mouth/Throat: Uvula is midline and mucous membranes are normal.   Eyes: EOM are normal. Pupils are equal, round, and reactive to light.   Neck: Trachea normal. Neck supple.   Cardiovascular:  Normal rate, regular rhythm and normal pulses.           Pulmonary/Chest: Tachypnea noted. She is in respiratory distress. She has wheezes. She has rhonchi.   Abdominal: Abdomen is soft. Bowel sounds are normal. There is no rebound and no guarding.   Musculoskeletal:         General: Normal range of motion.      Cervical back: Neck supple.     Neurological: She is alert and oriented to person, place, and time. GCS eye subscore is 4. GCS verbal subscore is 5. GCS motor subscore is 6.   Skin: Skin is warm and dry.   Psychiatric: She has a normal mood and affect. Her speech is normal. Thought content normal.         RESULTS:  Labs Reviewed   BASIC METABOLIC PANEL - Abnormal; Notable for the following components:       Result Value    Glucose Level 110 (*)     Blood Urea Nitrogen 5.6 (*)     All other components within normal limits   CBC WITH DIFFERENTIAL - Abnormal; Notable for the following components:    RBC 5.65 (*)     Hgb 9.9 (*)     Hct 33.3 (*)     MCV 58.9 (*)     MCH 17.5 (*)     MCHC 29.7 (*)     RDW 17.8 (*)     Platelet 129 (*)     IPF 11.3 (*)     Eos # 1.30 (*)     All other components within normal limits   COVID/RSV/FLU A&B PCR - Normal    Narrative:     The Xpert Xpress SARS-CoV-2/FLU/RSV plus is a rapid, multiplexed real-time PCR test intended for the simultaneous qualitative detection and differentiation of  SARS-CoV-2, Influenza A, Influenza B, and respiratory syncytial virus (RSV) viral RNA in either nasopharyngeal swab or nasal swab specimens.         B-TYPE NATRIURETIC PEPTIDE - Normal   CBC W/ AUTO DIFFERENTIAL    Narrative:     The following orders were created for panel order CBC auto differential.  Procedure                               Abnormality         Status                     ---------                               -----------         ------                     CBC with Differential[[id number]]       Abnormal            Final result                 Please view results for these tests on the individual orders.     Imaging Results              X-Ray Chest AP Portable (Preliminary result)  Result time 11/14/23 05:32:17      Wet Read by Iván Rocha DO (11/14/23 05:32:17, [facility], Emergency Medicine)    No dense lobar consolidation or pneumothorax.                                    PROCEDURES:  Procedures    ECG:  EKG Readings: (Independently Interpreted)   Initial Reading: No STEMI. Rhythm: Normal Sinus Rhythm. Heart Rate: 95. Ectopy: No Ectopy. Conduction: Normal. Axis: Normal.       ED COURSE AND MEDICAL DECISION MAKING:  Medications   albuterol-ipratropium 2.5 mg-0.5 mg/3 mL nebulizer solution 9 mL (9 mLs Nebulization Given 11/14/23 0403)   predniSONE tablet 60 mg (60 mg Oral Given 11/14/23 0402)   ketorolac injection 15 mg (15 mg Intramuscular Given 11/14/23 0430)     ED Course as of 11/14/23 0640   Tue Nov 14, 2023   0453 Wheezing has improved. Still having some rhonchi. [IB]   0535 Creatinine: 0.65 [IB]   0535 BNP: 13.4 [IB]   0552 WBC: 7.45 [IB]   0552 Hemoglobin(!): 9.9 [IB]   0552 Platelet Count(!): 129 [IB]      ED Course User Index  [IB] Iván Rocha DO        Medical Decision Making  53-year-old female who presents with increasing shortness of breath over the past couple of days associated with increased sputum production and chest tightness, denies any fevers.   She has diffuse wheezing and rales on exam which improved with DuoNeb especially with regards to her wheezing.  Given p.o. prednisone.  CBC shows no leukocytosis or leukopenia.  BMP grossly unremarkable.  BNP normal.  Chest x-ray shows chronic interstitial changes, no dense lobar consolidation.  Given her increasing shortness of breath and increased sputum production, I feel that she would benefit from a course of antibiotics in addition to burst dose steroids so we will discharge her with prednisone 60 mg daily for 5 days along with Augmentin and instructed to continue her inhalers as directed and follow up with her PCP.  Given strict ED return precautions. I have spoken with the patient and/or caregivers. I have explained the patient's condition, diagnoses and treatment plan based on the information available to me at this time. I have answered the patient's and/or caregiver's questions and addressed any concerns. The patient and/or caregivers have as good an understanding of the patient's diagnosis, condition and treatment plan as can be expected at this point. The vital signs have been stable. The patient's condition is stable and appropriate for discharge from the emergency department.     The patient will pursue further outpatient evaluation with the primary care physician or other designated or consulting physician as outlined in the discharge instructions. The patient and/or caregivers are agreeable to this plan of care and follow-up instructions have been explained in detail. The patient and/or caregivers have received these instructions in written format and have expressed an understanding of the discharge instructions. The patient and/or caregivers are aware that any significant change in condition or worsening of symptoms should prompt an immediate return to this or the closest emergency department or a call to 911.    Amount and/or Complexity of Data Reviewed  External Data Reviewed: labs, radiology  and notes.  Labs: ordered. Decision-making details documented in ED Course.  Radiology: ordered and independent interpretation performed.    Risk  OTC drugs.  Prescription drug management.        CLINICAL IMPRESSION:  1. Chronic obstructive pulmonary disease with acute exacerbation        DISPOSITION:   ED Disposition Condition    Discharge Stable            ED Prescriptions       Medication Sig Dispense Start Date End Date Auth. Provider    amoxicillin-clavulanate 875-125mg (AUGMENTIN) 875-125 mg per tablet Take 1 tablet by mouth 2 (two) times daily. 14 tablet 11/14/2023 -- Iván oRcha DO    predniSONE (DELTASONE) 20 MG tablet Take 3 tablets (60 mg total) by mouth once daily. for 5 days 15 tablet 11/14/2023 11/19/2023 Iván Rocha DO          Follow-up Information       Follow up With Specialties Details Why Contact Info    Coleman, Shonetelle B, NP Pediatrics Schedule an appointment as soon as possible for a visit   [address] [place] [address]  [facility]  [place] [address]  [phone]      [facility] Emergency Medicine  If symptoms worsen [address] [facility] [address] [phone]               Iván Rocha DO  11/14/23 1352

## 2023-11-20 DIAGNOSIS — J44.9 CHRONIC OBSTRUCTIVE PULMONARY DISEASE, UNSPECIFIED COPD TYPE: Primary | ICD-10-CM

## 2023-12-13 DIAGNOSIS — K76.0 FATTY LIVER: Primary | ICD-10-CM

## 2024-03-05 ENCOUNTER — TELEPHONE (OUTPATIENT)
Dept: RHEUMATOLOGY | Facility: CLINIC | Age: 54
End: 2024-03-05
Payer: MEDICAID

## 2024-03-05 DIAGNOSIS — M79.7 FIBROMYALGIA: ICD-10-CM

## 2024-03-05 NOTE — TELEPHONE ENCOUNTER
Patient called stating that she is having some bilateral leg pain .   She is requesting an appointment . I did see that she did see you   In September and her appt. Was cancelled in January . She is wanting   To continue seeing you as a patient . She did ask for a refill of gabapentin .  I did let her know that this Is a medication that you aren't prescribing . She Is   Wanting to know if you could prescribe something else that will help with the   Leg pain before she see's you . Please Advise Thanks .

## 2024-03-07 RX ORDER — GABAPENTIN 300 MG/1
300 CAPSULE ORAL NIGHTLY
Qty: 30 CAPSULE | Refills: 5 | Status: SHIPPED | OUTPATIENT
Start: 2024-03-07 | End: 2024-06-17 | Stop reason: SDUPTHER

## 2024-03-07 NOTE — TELEPHONE ENCOUNTER
I refilled the Gabapentin. I cannot refill the Mirapex. You can schedule her with me. I'll have to complete her work up and then If I feel like she would have to be referred to a Rheumatologist in the future than I can do that.

## 2024-03-11 DIAGNOSIS — J45.998 OTHER ASTHMA: Primary | ICD-10-CM

## 2024-03-11 DIAGNOSIS — R05.3 CHRONIC COUGH: ICD-10-CM

## 2024-03-11 DIAGNOSIS — J30.9 ALLERGIC RHINITIS, UNSPECIFIED: ICD-10-CM

## 2024-03-11 NOTE — ASSESSMENT & PLAN NOTE
Continues with myalgia and bilateral leg pain. Reports she was out of Gabapentin for almost 3 months. She restarted Gabapentin 7 days ago with improvement.    Continue Gabapentin 300 mg nightly  Restart magnesium oxide 400 mg nightly    Fibromyalgia is a chronic pain syndrome.  Underlying causes of fibromyalgia may be numerous.  An underlying nonrestorative sleep pattern, mental and physical stressors play a role in pain perception.  It is important to decrease stress levels and improving sleep patterns/hygiene, exercise and a healthy life-style is important in management of this syndrome.

## 2024-03-11 NOTE — ASSESSMENT & PLAN NOTE
Currently taking  mg BID- will reduce to dose at 5mg /kg-  mg daily    Previously on Meloxicam. That was discontinued d/t therapy not effective and was started on Diclofenac in September 2023.   Reports she is currently taking Ibuprofen and not taking Diclofenac, she is unsure which one works better fo rher. She knows that she cannot take both of these medications at the same time. She will let me know which one works better for her and continue on that medication.    Plaquenil Eye Exam: [facility]    Labs ordered today for continued monitoring  Order Pool ESPINOZA

## 2024-03-14 ENCOUNTER — LAB VISIT (OUTPATIENT)
Dept: LAB | Facility: HOSPITAL | Age: 54
End: 2024-03-14
Payer: MEDICAID

## 2024-03-14 ENCOUNTER — HOSPITAL ENCOUNTER (OUTPATIENT)
Dept: RADIOLOGY | Facility: HOSPITAL | Age: 54
Discharge: HOME | End: 2024-03-14
Payer: MEDICAID

## 2024-03-14 ENCOUNTER — OFFICE VISIT (OUTPATIENT)
Dept: RHEUMATOLOGY | Facility: CLINIC | Age: 54
End: 2024-03-14
Payer: MEDICAID

## 2024-03-14 VITALS
WEIGHT: 144 LBS | DIASTOLIC BLOOD PRESSURE: 76 MMHG | RESPIRATION RATE: 18 BRPM | OXYGEN SATURATION: 99 % | HEART RATE: 73 BPM | BODY MASS INDEX: 20.62 KG/M2 | HEIGHT: 70 IN | SYSTOLIC BLOOD PRESSURE: 120 MMHG | TEMPERATURE: 98 F

## 2024-03-14 DIAGNOSIS — G89.29 CHRONIC PAIN OF RIGHT KNEE: ICD-10-CM

## 2024-03-14 DIAGNOSIS — M25.561 CHRONIC PAIN OF RIGHT KNEE: ICD-10-CM

## 2024-03-14 DIAGNOSIS — M54.41 CHRONIC BILATERAL LOW BACK PAIN WITH RIGHT-SIDED SCIATICA: ICD-10-CM

## 2024-03-14 DIAGNOSIS — E55.9 VITAMIN D DEFICIENCY: ICD-10-CM

## 2024-03-14 DIAGNOSIS — G89.29 CHRONIC BILATERAL LOW BACK PAIN WITH RIGHT-SIDED SCIATICA: ICD-10-CM

## 2024-03-14 DIAGNOSIS — M35.9 UNDIFFERENTIATED CONNECTIVE TISSUE DISEASE: ICD-10-CM

## 2024-03-14 DIAGNOSIS — M35.9 UNDIFFERENTIATED CONNECTIVE TISSUE DISEASE: Primary | ICD-10-CM

## 2024-03-14 DIAGNOSIS — G25.81 RESTLESS LEG SYNDROME: ICD-10-CM

## 2024-03-14 DIAGNOSIS — M79.7 FIBROMYALGIA: ICD-10-CM

## 2024-03-14 DIAGNOSIS — G47.00 INSOMNIA, UNSPECIFIED TYPE: ICD-10-CM

## 2024-03-14 LAB
ALBUMIN SERPL-MCNC: 4.2 G/DL (ref 3.5–5)
ALBUMIN/GLOB SERPL: 1.1 RATIO (ref 1.1–2)
ALP SERPL-CCNC: 90 UNIT/L (ref 40–150)
ALT SERPL-CCNC: 19 UNIT/L (ref 0–55)
AST SERPL-CCNC: 19 UNIT/L (ref 5–34)
BASOPHILS # BLD AUTO: 0.04 X10(3)/MCL
BASOPHILS NFR BLD AUTO: 0.6 %
BILIRUB SERPL-MCNC: 0.6 MG/DL
BUN SERPL-MCNC: 12 MG/DL (ref 9.8–20.1)
C3 SERPL-MCNC: 151 MG/DL (ref 80–173)
C4 SERPL-MCNC: 23 MG/DL (ref 13–46)
CALCIUM SERPL-MCNC: 9.6 MG/DL (ref 8.4–10.2)
CHLORIDE SERPL-SCNC: 104 MMOL/L (ref 98–107)
CO2 SERPL-SCNC: 25 MMOL/L (ref 22–29)
CREAT SERPL-MCNC: 0.8 MG/DL (ref 0.55–1.02)
CRP SERPL-MCNC: 7.5 MG/L
EOSINOPHIL # BLD AUTO: 0.39 X10(3)/MCL (ref 0–0.9)
EOSINOPHIL NFR BLD AUTO: 5.4 %
ERYTHROCYTE [DISTWIDTH] IN BLOOD BY AUTOMATED COUNT: 18.5 % (ref 11.5–17)
ERYTHROCYTE [SEDIMENTATION RATE] IN BLOOD: 50 MM/HR (ref 0–20)
GFR SERPLBLD CREATININE-BSD FMLA CKD-EPI: >60 MLS/MIN/1.73/M2
GLOBULIN SER-MCNC: 3.9 GM/DL (ref 2.4–3.5)
GLUCOSE SERPL-MCNC: 90 MG/DL (ref 74–100)
HCT VFR BLD AUTO: 37.5 % (ref 37–47)
HGB BLD-MCNC: 11.2 G/DL (ref 12–16)
IMM GRANULOCYTES # BLD AUTO: 0.02 X10(3)/MCL (ref 0–0.04)
IMM GRANULOCYTES NFR BLD AUTO: 0.3 %
LYMPHOCYTES # BLD AUTO: 2.09 X10(3)/MCL (ref 0.6–4.6)
LYMPHOCYTES NFR BLD AUTO: 29.1 %
MCH RBC QN AUTO: 17.8 PG (ref 27–31)
MCHC RBC AUTO-ENTMCNC: 29.9 G/DL (ref 33–36)
MCV RBC AUTO: 59.7 FL (ref 80–94)
MONOCYTES # BLD AUTO: 0.51 X10(3)/MCL (ref 0.1–1.3)
MONOCYTES NFR BLD AUTO: 7.1 %
NEUTROPHILS # BLD AUTO: 4.12 X10(3)/MCL (ref 2.1–9.2)
NEUTROPHILS NFR BLD AUTO: 57.5 %
NRBC BLD AUTO-RTO: 0 %
PLATELET # BLD AUTO: 216 X10(3)/MCL (ref 130–400)
PMV BLD AUTO: ABNORMAL FL
POTASSIUM SERPL-SCNC: 3.9 MMOL/L (ref 3.5–5.1)
PROT SERPL-MCNC: 8.1 GM/DL (ref 6.4–8.3)
RBC # BLD AUTO: 6.28 X10(6)/MCL (ref 4.2–5.4)
SODIUM SERPL-SCNC: 139 MMOL/L (ref 136–145)
WBC # SPEC AUTO: 7.17 X10(3)/MCL (ref 4.5–11.5)

## 2024-03-14 PROCEDURE — 3078F DIAST BP <80 MM HG: CPT | Mod: CPTII,,,

## 2024-03-14 PROCEDURE — 83516 IMMUNOASSAY NONANTIBODY: CPT

## 2024-03-14 PROCEDURE — 86160 COMPLEMENT ANTIGEN: CPT

## 2024-03-14 PROCEDURE — 3008F BODY MASS INDEX DOCD: CPT | Mod: CPTII,,,

## 2024-03-14 PROCEDURE — 72110 X-RAY EXAM L-2 SPINE 4/>VWS: CPT | Mod: TC

## 2024-03-14 PROCEDURE — 86235 NUCLEAR ANTIGEN ANTIBODY: CPT

## 2024-03-14 PROCEDURE — 86800 THYROGLOBULIN ANTIBODY: CPT

## 2024-03-14 PROCEDURE — 85652 RBC SED RATE AUTOMATED: CPT

## 2024-03-14 PROCEDURE — 86225 DNA ANTIBODY NATIVE: CPT

## 2024-03-14 PROCEDURE — 1159F MED LIST DOCD IN RCRD: CPT | Mod: CPTII,,,

## 2024-03-14 PROCEDURE — 99215 OFFICE O/P EST HI 40 MIN: CPT | Mod: PBBFAC,25

## 2024-03-14 PROCEDURE — 36415 COLL VENOUS BLD VENIPUNCTURE: CPT

## 2024-03-14 PROCEDURE — 86376 MICROSOMAL ANTIBODY EACH: CPT

## 2024-03-14 PROCEDURE — 99999 PR PBB SHADOW E&M-EST. PATIENT-LVL V: CPT | Mod: PBBFAC,,,

## 2024-03-14 PROCEDURE — 99215 OFFICE O/P EST HI 40 MIN: CPT | Mod: S$PBB,,,

## 2024-03-14 PROCEDURE — 85025 COMPLETE CBC W/AUTO DIFF WBC: CPT

## 2024-03-14 PROCEDURE — 3074F SYST BP LT 130 MM HG: CPT | Mod: CPTII,,,

## 2024-03-14 PROCEDURE — 73560 X-RAY EXAM OF KNEE 1 OR 2: CPT | Mod: TC,RT

## 2024-03-14 PROCEDURE — 80053 COMPREHEN METABOLIC PANEL: CPT

## 2024-03-14 PROCEDURE — 86039 ANTINUCLEAR ANTIBODIES (ANA): CPT

## 2024-03-14 PROCEDURE — 86140 C-REACTIVE PROTEIN: CPT

## 2024-03-14 RX ORDER — FLUTICASONE FUROATE, UMECLIDINIUM BROMIDE AND VILANTEROL TRIFENATATE 200; 62.5; 25 UG/1; UG/1; UG/1
1 POWDER RESPIRATORY (INHALATION) DAILY
COMMUNITY

## 2024-03-14 RX ORDER — DOXYCYCLINE 100 MG/1
100 CAPSULE ORAL DAILY
COMMUNITY
Start: 2024-02-28 | End: 2024-06-17

## 2024-03-14 RX ORDER — HYDROXYCHLOROQUINE SULFATE 200 MG/1
TABLET, FILM COATED ORAL
Qty: 30 TABLET | Refills: 5 | Status: SHIPPED | OUTPATIENT
Start: 2024-03-14 | End: 2024-03-14 | Stop reason: SDUPTHER

## 2024-03-14 RX ORDER — IBUPROFEN 600 MG/1
600 TABLET ORAL EVERY 8 HOURS PRN
COMMUNITY
Start: 2024-02-19

## 2024-03-14 RX ORDER — LORATADINE 10 MG/1
10 TABLET ORAL DAILY
COMMUNITY

## 2024-03-14 RX ORDER — HYDROXYCHLOROQUINE SULFATE 200 MG/1
200 TABLET, FILM COATED ORAL DAILY
Qty: 30 TABLET | Refills: 5 | Status: SHIPPED | OUTPATIENT
Start: 2024-03-14 | End: 2024-06-17 | Stop reason: SDUPTHER

## 2024-03-14 RX ORDER — MONTELUKAST SODIUM 10 MG/1
10 TABLET ORAL DAILY
COMMUNITY
Start: 2024-02-25

## 2024-03-14 RX ORDER — PANTOPRAZOLE SODIUM 20 MG/1
1 TABLET, DELAYED RELEASE ORAL DAILY
COMMUNITY
Start: 2024-01-23

## 2024-03-14 RX ORDER — LANOLIN ALCOHOL/MO/W.PET/CERES
400 CREAM (GRAM) TOPICAL NIGHTLY
Qty: 30 TABLET | Refills: 5 | Status: SHIPPED | OUTPATIENT
Start: 2024-03-14

## 2024-03-14 NOTE — ASSESSMENT & PLAN NOTE
Previously prescribed Mirapex with improvement- she is aware she is no longer is able to get this prescription from this clinic as Dr. Marshall is no longer at this clinic- Encouraged to discuss with PCP- and appropriate treatment/work up/referral for RLS.

## 2024-03-14 NOTE — PROGRESS NOTES
Subjective:           Patient ID: Sarah Saravia is a 53 y.o. female.    Chief Complaint: Follow-up (Right leg pain )       is a 52 y/o female here for a f./u. She was initially a self referral for joint poin and shortness of breath. She established care with Dr. Marshall on 11/5/2021 . Past labs: YING 1:80 Nucleolar, (from [facility]). And she was initiated on hydroxychloroquine. She was previously lost to follow up for over 1 year ([id number]) She was started on HCQ. She does report improvement in joint pain since restarting the medication. She was also started     Reports she previously did blood work for her PCP and was told she has a Fatty Liver? She did show an abdominal ultrasound result from [facility] on her phone which showed renal cysts, splenomegaly and mild hepatomegaly.     Followed by Pulmonologist - Dr. Wyatt for COPD  Followed by Allergist- Dr Wing- allergies    Denies history of fevers, rashes, photosensitivity, oral or nasal ulcers, h/o MI, stroke, seizures, h/o PE or DVT, Raynaud's phenomenon, uveitis, malignancies.     Today: Continues on HCQ. She is c/o of leg pain: Right leg pain is worse than left leg. Reports that the pain is intermittent (good for 2 days then gets pain for 1-2 days) describes it as tingling sensation starting from the foot up to the knee. The leg pain is occasionally associated with muscle cramping. Also reported localized knee pain with and without leg pain. Pain is improved with heating pad and Ibuprofen. She does report being out of the gabepentin for a few months. She did restart the Gabapentin 7 days with some relief. Continues to have lower back pain- reports she's never been to physical therapy. She reports recent referrals to gastro for fatty liver and pulmonoligst for COPD and also referred to ENT. Reports she stays active and she has been walking more than usual. She is aware that since Dr. Marshall is no longer at this clinic she will not receive any  "scheduled medications including Mirapex for RLS.       Review of Systems   Constitutional:  Negative for appetite change, chills and fever.   HENT:  Negative for congestion, ear pain, mouth sores, nosebleeds and trouble swallowing.    Eyes:  Negative for photophobia and discharge.   Respiratory:  Negative for chest tightness and shortness of breath.    Cardiovascular:  Negative for chest pain.   Gastrointestinal:  Negative for abdominal pain and vomiting.   Endocrine: Negative.    Genitourinary:  Negative for hematuria.   Musculoskeletal:  Positive for back pain and myalgias.        As per HPI   Skin:  Negative for rash.   Neurological:  Positive for numbness. Negative for weakness.   Psychiatric/Behavioral:  The patient is nervous/anxious.          Objective:   /76 (BP Location: Right arm, Patient Position: Sitting, BP Method: Medium (Automatic))   Pulse 73   Temp 98.4 °F (36.9 °C) (Oral)   Resp 18   Ht 5' 10" (1.778 m)   Wt 65.3 kg (144 lb)   SpO2 99%   BMI 20.66 kg/m²          Physical Exam   Constitutional: She is oriented to person, place, and time. She appears well-developed and well-nourished. No distress.   HENT:   Head: Normocephalic and atraumatic.   Right Ear: External ear normal.   Left Ear: External ear normal.   Eyes: Pupils are equal, round, and reactive to light.   Cardiovascular: Normal rate.   Pulmonary/Chest: Effort normal.   Abdominal: Soft. There is no abdominal tenderness.   Musculoskeletal:      Cervical back: Neck supple.   Lymphadenopathy:     She has no cervical adenopathy.   Neurological: She is alert and oriented to person, place, and time. She displays normal reflexes. No cranial nerve deficit or sensory deficit. She exhibits normal muscle tone. Coordination normal.   Skin: No rash noted. No erythema.   Vitals reviewed.      Right Side Rheumatological Exam     Examination finds the 1st PIP, 1st MCP, 2nd PIP, 2nd MCP, 3rd PIP, 3rd MCP, 4th PIP, 4th MCP, 5th PIP and 5th MCP " normal.    Left Side Rheumatological Exam     Examination finds the 1st PIP, 1st MCP, 2nd PIP, 2nd MCP, 3rd PIP, 3rd MCP, 4th PIP, 4th MCP, 5th PIP and 5th MCP normal.         Completed Fibromyalgia exam 18/18 tender points.    No data to display     Assessment:         Medication List with Changes/Refills   Current Medications    ALBUTEROL (ACCUNEB) 1.25 MG/3 ML NEBU    Inhale 1 ampule into the lungs every 6 (six) hours as needed.       Start Date: --        End Date: --    ALBUTEROL (PROVENTIL/VENTOLIN HFA) 90 MCG/ACTUATION INHALER    Inhale 2 puffs into the lungs every 4 to 6 hours as needed for Wheezing.       Start Date: 9/14/2023 End Date: --    CYPROHEPTADINE (PERIACTIN) 4 MG TABLET    Take 4 mg by mouth 2 (two) times daily.       Start Date: 6/5/2023  End Date: --    DICLOFENAC (VOLTAREN) 75 MG EC TABLET    Take 1 tablet (75 mg total) by mouth daily as needed (pain, after food).       Start Date: 9/8/2023  End Date: --    DOXYCYCLINE (MONODOX) 100 MG CAPSULE    Take 100 mg by mouth once daily.       Start Date: 2/28/2024 End Date: --    ERGOCALCIFEROL (ERGOCALCIFEROL) 50,000 UNIT CAP    Take 1 capsule (50,000 Units total) by mouth every 7 days.       Start Date: 6/14/2023 End Date: --    FERROUS GLUCONATE (FERGON) 324 MG TABLET    Take 1 tablet by mouth once a week.       Start Date: --        End Date: --    FLUTICASONE PROPIONATE (FLONASE) 50 MCG/ACTUATION NASAL SPRAY    1 spray by Each Nostril route as needed.       Start Date: 8/22/2023 End Date: --    GABAPENTIN (NEURONTIN) 300 MG CAPSULE    Take 1 capsule (300 mg total) by mouth every evening.       Start Date: 3/7/2024  End Date: --    IBUPROFEN (ADVIL,MOTRIN) 600 MG TABLET    Take 600 mg by mouth every 8 (eight) hours as needed.       Start Date: 2/19/2024 End Date: --    LORATADINE (CLARITIN) 10 MG TABLET    Take 10 mg by mouth once daily.       Start Date: --        End Date: --    MONTELUKAST (SINGULAIR) 10 MG TABLET    Take 10 mg by mouth once  daily.       Start Date: 2/25/2024 End Date: --    PANTOPRAZOLE (PROTONIX) 20 MG TABLET    Take 1 tablet by mouth once daily.       Start Date: 1/23/2024 End Date: --    PRAMIPEXOLE (MIRAPEX) 0.25 MG TABLET    Take 1 tablet (0.25 mg total) by mouth every evening.       Start Date: 9/8/2023  End Date: --    TRELEGY ELLIPTA 200-62.5-25 MCG INHALER    Inhale 1 puff into the lungs once daily.       Start Date: --        End Date: --   Changed and/or Refilled Medications    Modified Medication Previous Medication    HYDROXYCHLOROQUINE (PLAQUENIL) 200 MG TABLET hydrOXYchloroQUINE (PLAQUENIL) 200 mg tablet       Take 1 tablet (200 mg total) by mouth once daily. TAKE 1 TABLET BY MOUTH TWICE DAILY WITH FOOD OR MILK    TAKE 1 TABLET BY MOUTH TWICE DAILY WITH FOOD OR MILK       Start Date: 3/14/2024 End Date: --    Start Date: 9/8/2023  End Date: 3/14/2024    MAGNESIUM OXIDE (MAG-OX) 400 MG (241.3 MG MAGNESIUM) TABLET magnesium oxide (MAG-OX) 400 mg (241.3 mg magnesium) tablet       Take 1 tablet (400 mg total) by mouth nightly.    Take 1 tablet (400 mg total) by mouth nightly.       Start Date: 3/14/2024 End Date: --    Start Date: 9/8/2023  End Date: 3/14/2024   Discontinued Medications    AMOXICILLIN-CLAVULANATE 875-125MG (AUGMENTIN) 875-125 MG PER TABLET    Take 1 tablet by mouth 2 (two) times daily.       Start Date: 11/14/2023End Date: 3/14/2024    OMEPRAZOLE (PRILOSEC) 40 MG CAPSULE    Take 1 capsule (40 mg total) by mouth once daily.       Start Date: 9/8/2023  End Date: 3/14/2024    SYMBICORT 160-4.5 MCG/ACTUATION HFAA    Inhale 2 puffs into the lungs once daily.       Start Date: 5/19/2023 End Date: 3/14/2024         ICD-10-CM ICD-9-CM   1. Undifferentiated connective tissue disease  M35.9 710.9   2. Fibromyalgia  M79.7 729.1   3. Chronic bilateral low back pain with right-sided sciatica  M54.41 724.2    G89.29 724.3     338.29   4. Chronic pain of right knee  M25.561 719.46    G89.29 338.29   5. Restless leg  syndrome  G25.81 333.94   6. Vitamin D deficiency  E55.9 268.9   7. Insomnia, unspecified type  G47.00 780.52           Plan:       1. Undifferentiated connective tissue disease  Overview:  YING 1:320 Nucleolar  YING 1:160 Homogenous      Assessment & Plan:  Currently taking  mg BID- will reduce to dose at 5mg /kg-  mg daily    Previously on Meloxicam. That was discontinued d/t therapy not effective and was started on Diclofenac in September 2023.   Reports she is currently taking Ibuprofen and not taking Diclofenac, she is unsure which one works better fo rher. She knows that she cannot take both of these medications at the same time. She will let me know which one works better for her and continue on that medication.    Plaquenil Eye Exam: [facility]    Labs ordered today for continued monitoring  Order ALEXIS, Complements    Orders:  -     ANTI-SSA + ANTI-SSB; Future; Expected date: 03/14/2024  -     THYROID PEROXIDASE (TPO); Future; Expected date: 03/14/2024  -     Anti-Thyroglobulin Antibody; Future; Expected date: 03/14/2024  -     Anti-Scleroderma Antibody; Future; Expected date: 03/14/2024  -     CBC Auto Differential; Future; Expected date: 03/14/2024  -     Comprehensive Metabolic Panel; Future; Expected date: 03/14/2024  -     Sedimentation rate; Future; Expected date: 03/14/2024  -     C-Reactive Protein; Future; Expected date: 03/14/2024  -     C4 Complement; Future; Expected date: 03/14/2024  -     C3 Complement; Future; Expected date: 03/14/2024  -     YING IgG by IFA; Future; Expected date: 03/14/2024  -     dsDNA Ab with Crithidia Reflex; Future; Expected date: 03/14/2024  -     SM and SM/RNP Antibodies; Future; Expected date: 03/14/2024  -     Miscellaneous Test, Sendout RNA polymerase III; Future; Expected date: 03/14/2024  -     Discontinue: hydroxychloroquine (PLAQUENIL) 200 mg tablet; TAKE 1 TABLET BY MOUTH TWICE DAILY WITH FOOD OR MILK  Dispense: 30 tablet; Refill: 5  -      hydroxychloroquine (PLAQUENIL) 200 mg tablet; Take 1 tablet (200 mg total) by mouth once daily. TAKE 1 TABLET BY MOUTH TWICE DAILY WITH FOOD OR MILK  Dispense: 30 tablet; Refill: 5    2. Fibromyalgia  Assessment & Plan:  Continues with myalgia and bilateral leg pain. Reports she was out of Gabapentin for almost 3 months. She restarted Gabapentin 7 days ago with improvement.    Continue Gabapentin 300 mg nightly  Restart magnesium oxide 400 mg nightly    Fibromyalgia is a chronic pain syndrome.  Underlying causes of fibromyalgia may be numerous.  An underlying nonrestorative sleep pattern, mental and physical stressors play a role in pain perception.  It is important to decrease stress levels and improving sleep patterns/hygiene, exercise and a healthy life-style is important in management of this syndrome.          Orders:  -     magnesium oxide (MAG-OX) 400 mg (241.3 mg magnesium) tablet; Take 1 tablet (400 mg total) by mouth nightly.  Dispense: 30 tablet; Refill: 5    3. Chronic bilateral low back pain with right-sided sciatica  Assessment & Plan:  Xray of lumbar spine  Consider physical therapy    Orders:  -     X-Ray Lumbar Spine 5 View; Future; Expected date: 03/14/2024    4. Chronic pain of right knee  Assessment & Plan:  Xray of right knee    Orders:  -     X-Ray Knee 1 or 2 View Right; Future; Expected date: 03/14/2024    5. Restless leg syndrome  Assessment & Plan:  Previously prescribed Mirapex with improvement- she is aware she is no longer is able to get this prescription from this clinic as Dr. Marshall is no longer at this clinic- Encouraged to discuss with PCP- and appropriate treatment/work up/referral for RLS.       6. Vitamin D deficiency  Assessment & Plan:  Check Vit D    Orders:  -     Vitamin D; Future; Expected date: 06/14/2024    7. Insomnia, unspecified type  Assessment & Plan:  Avoid caffeine, alcohol and stimulants.  Power down electronic devices at least one hour prior to bedtime.  Keep  room dark; use eye mask or relaxation sound machine to promote rest.  Sleep hygiene refers to actions that tend to improve and maintain good sleep:  Sleep as long as necessary to feel rested (usually seven to eight hours for adults) and then get out of bed  Maintain a regular sleep schedule, particularly a regular wake-up time in the morning  Avoid smoking or other nicotine intake, particularly during the evening                 45 minutes of total time spent on the encounter, which includes face to face time and non-face to face time preparing to see the patient (eg, review of tests), Obtaining and/or reviewing separately obtained history, Documenting clinical information in the electronic or other health record, Independently interpreting results (not separately reported) and communicating results to the patient/family/caregiver, or Care coordination (not separately reported).

## 2024-03-15 PROBLEM — E55.9 VITAMIN D DEFICIENCY: Status: ACTIVE | Noted: 2024-03-15

## 2024-03-15 LAB
ANA PAT SER IF-IMP: ABNORMAL
ANA SER QL HEP2 SUBST: ABNORMAL
ANA TITR SER HEP2 SUBST: ABNORMAL {TITER}

## 2024-03-17 LAB
DSDNA AB QUANT (OHS): 4.1 IU/ML
SCL-70S AB QUANT (OHS): <0.6 U/ML
SMITH AB QUANT (OHS): <0.7 U/ML
SSA(RO) AB QUANT (OHS): <0.4 U/ML
SSB(LA) AB QUANT (OHS): <0.4 U/ML
THYROGLOB AB SERPL IA-ACNC: <12 IU/ML
THYROID PEROXIDASE QUANT (OLG): <4 IU/ML

## 2024-03-18 LAB — MAYO GENERIC ORDERABLE RESULT: NORMAL

## 2024-03-19 ENCOUNTER — TELEPHONE (OUTPATIENT)
Dept: RHEUMATOLOGY | Facility: CLINIC | Age: 54
End: 2024-03-19
Payer: MEDICAID

## 2024-03-19 DIAGNOSIS — G89.29 CHRONIC BILATERAL LOW BACK PAIN WITH RIGHT-SIDED SCIATICA: Primary | ICD-10-CM

## 2024-03-19 DIAGNOSIS — M54.41 CHRONIC BILATERAL LOW BACK PAIN WITH RIGHT-SIDED SCIATICA: Primary | ICD-10-CM

## 2024-03-19 NOTE — PROGRESS NOTES
Call results  Results reviewed. Repeat YING is the same titer and still positive but all of the connective tissue labs are negative. This means she does not have Lupus, scleroderma, etc.  The inflammatory labs are elevated, these labs tell us she has inflammation but are non specific.     Xray of right Knee is completely normal  Xray of lower back shows degnerative changes-this xray is limited due to her positiioning during the xray.    I recommend she continue hydroxychloroquine and if she is willing, I can refer her to physical therapy for the lower back pain.          GERONIMO Ryan, H[place]IL[place]

## 2024-03-19 NOTE — TELEPHONE ENCOUNTER
Spoke with patient verbalized understanding . She is wanting a referral sent to physical therapy.  She didn't specify where. Please Advise. Thanks

## 2024-03-19 NOTE — TELEPHONE ENCOUNTER
----- Message from KEVIN Cortes sent at 3/19/2024  1:44 PM CDT -----  Call results  Results reviewed. Repeat YING is the same titer and still positive but all of the connective tissue labs are negative. This means she does not have Lupus, scleroderma, etc.  The inflammatory labs are elevated, these labs tell us she has inflammation but are non specific.     Xray of right Knee is completely normal  Xray of lower back shows degnerative changes-this xray is limited due to her positiioning during the xray.    I recommend she continue hydroxychloroquine and if she is willing, I can refer her to physical therapy for the lower back pain.          GERONIMO Ryan, H[place]IL[place]

## 2024-03-20 NOTE — TELEPHONE ENCOUNTER
I see her address is in [place]. I ordered the physical therapy for [facility]. It is an external referral- so you will have to manually fax it to them. We can send it to another Physical Therapy if the patient has a preference.

## 2024-04-12 ENCOUNTER — OFFICE VISIT (OUTPATIENT)
Dept: OTOLARYNGOLOGY | Facility: CLINIC | Age: 54
End: 2024-04-12
Payer: MEDICAID

## 2024-04-12 VITALS
HEIGHT: 70 IN | DIASTOLIC BLOOD PRESSURE: 76 MMHG | SYSTOLIC BLOOD PRESSURE: 119 MMHG | BODY MASS INDEX: 21.3 KG/M2 | TEMPERATURE: 98 F | HEART RATE: 61 BPM | WEIGHT: 148.81 LBS

## 2024-04-12 DIAGNOSIS — J30.9 ALLERGIC RHINITIS, UNSPECIFIED: ICD-10-CM

## 2024-04-12 DIAGNOSIS — J32.9 CHRONIC SINUSITIS, UNSPECIFIED LOCATION: ICD-10-CM

## 2024-04-12 DIAGNOSIS — J45.998 OTHER ASTHMA: ICD-10-CM

## 2024-04-12 DIAGNOSIS — R05.3 CHRONIC COUGH: ICD-10-CM

## 2024-04-12 PROCEDURE — 1159F MED LIST DOCD IN RCRD: CPT | Mod: CPTII,,, | Performed by: NURSE PRACTITIONER

## 2024-04-12 PROCEDURE — 3074F SYST BP LT 130 MM HG: CPT | Mod: CPTII,,, | Performed by: NURSE PRACTITIONER

## 2024-04-12 PROCEDURE — 99214 OFFICE O/P EST MOD 30 MIN: CPT | Mod: PBBFAC | Performed by: NURSE PRACTITIONER

## 2024-04-12 PROCEDURE — 99204 OFFICE O/P NEW MOD 45 MIN: CPT | Mod: S$PBB,,, | Performed by: NURSE PRACTITIONER

## 2024-04-12 PROCEDURE — 3008F BODY MASS INDEX DOCD: CPT | Mod: CPTII,,, | Performed by: NURSE PRACTITIONER

## 2024-04-12 PROCEDURE — 3078F DIAST BP <80 MM HG: CPT | Mod: CPTII,,, | Performed by: NURSE PRACTITIONER

## 2024-04-12 RX ORDER — PREDNISONE 20 MG/1
20 TABLET ORAL SEE ADMIN INSTRUCTIONS
Qty: 21 TABLET | Refills: 0 | Status: SHIPPED | OUTPATIENT
Start: 2024-04-12 | End: 2024-04-26

## 2024-04-12 RX ORDER — AMOXICILLIN AND CLAVULANATE POTASSIUM 875; 125 MG/1; MG/1
1 TABLET, FILM COATED ORAL 2 TIMES DAILY
Qty: 20 TABLET | Refills: 0 | Status: SHIPPED | OUTPATIENT
Start: 2024-04-12 | End: 2024-04-22

## 2024-04-12 RX ORDER — AZELASTINE 1 MG/ML
2 SPRAY, METERED NASAL 2 TIMES DAILY
COMMUNITY
Start: 2024-03-19

## 2024-04-12 NOTE — PROGRESS NOTES
[facility]  Otolaryngology Clinic Note    Sarah Saravia  YOB: 1970    Chief Complaint:   Chief Complaint   Patient presents with    referral: Allergic Rhinitis        HPI: 2024: 53 y.o. female referred for sinus concerns. Endorses nasal congestion, intermittent rhinorrhea and PND, infraorbital and frontal pressure, anosmia, ageusia, epiphora, & intermittent sneezing. States her symptoms began about 8 months ago and have been progressively worsening. Historically, her allergy symptoms are worse in the spring and summer. She takes claritin and singulair daily and uses flonase and astelin daily as needed. She has recently begun using OTC nasal decongestants prn for nasal congestion. Two weeks ago, she completed a 2 week course of doxycycline prescribed by pulmonologist, Dr. Wyatt. Reports he did an XR of her sinuses and told her she had significant dz. States abx helped for a short time while she was taking meds but symptoms have since returned. She has not been on any steroids. States she has a navage but only used it a few times because family members told her it was bad. Reports getting purulent appearing mucous in return when she used navage. Reports she sees pulmonologist because she had COPD and that this dx is frustrating because has never smoked.      ROS:   10-point review of systems negative except per HPI      Review of patient's allergies indicates:  No Known Allergies    Past Medical History:   Diagnosis Date    Arthritis     COPD (chronic obstructive pulmonary disease)     GERD (gastroesophageal reflux disease) 2023    I take medicine for acid reflux    Rheumatoid arthritis 2020    Have aching pain in legs       Past Surgical History:   Procedure Laterality Date     SECTION         Social History     Socioeconomic History    Marital status:    Tobacco Use    Smoking status: Never     Passive exposure: Never    Smokeless  tobacco: Never   Substance and Sexual Activity    Alcohol use: Yes     Alcohol/week: 1.0 standard drink of alcohol     Types: 1 Glasses of wine per week     Comment: monthly    Drug use: Never    Sexual activity: Yes     Partners: Male     Birth control/protection: Post-menopausal       Family History   Problem Relation Age of Onset    Heart disease Mother         My mom have a pace maker    Colon cancer Father     Stomach cancer Father     Breast cancer Sister     Arthritis Maternal Grandmother         My mom mom had arthritis    Heart disease Maternal Grandmother         She had a bad heart    Cancer Sister         My sister had breast cancer she had both of them remove    Cancer Maternal Aunt         My mom sister had breast cancer she had one remove       Outpatient Encounter Medications as of 4/12/2024   Medication Sig Dispense Refill    albuterol (ACCUNEB) 1.25 mg/3 mL Nebu Inhale 1 ampule into the lungs every 6 (six) hours as needed.      albuterol (PROVENTIL/VENTOLIN HFA) 90 mcg/actuation inhaler Inhale 2 puffs into the lungs every 4 to 6 hours as needed for Wheezing. 18 g 0    azelastine (ASTELIN) 137 mcg (0.1 %) nasal spray 2 sprays 2 (two) times daily.      cyproheptadine (PERIACTIN) 4 mg tablet Take 4 mg by mouth 2 (two) times daily.      diclofenac (VOLTAREN) 75 MG EC tablet Take 1 tablet (75 mg total) by mouth daily as needed (pain, after food). 30 tablet 5    ergocalciferol (ERGOCALCIFEROL) 50,000 unit Cap Take 1 capsule (50,000 Units total) by mouth every 7 days. 5 capsule 3    fluticasone propionate (FLONASE) 50 mcg/actuation nasal spray 1 spray by Each Nostril route as needed.      gabapentin (NEURONTIN) 300 MG capsule Take 1 capsule (300 mg total) by mouth every evening. 30 capsule 5    hydroxychloroquine (PLAQUENIL) 200 mg tablet Take 1 tablet (200 mg total) by mouth once daily. TAKE 1 TABLET BY MOUTH TWICE DAILY WITH FOOD OR MILK 30 tablet 5    ibuprofen (ADVIL,MOTRIN) 600 MG tablet Take 600 mg  "by mouth every 8 (eight) hours as needed.      loratadine (CLARITIN) 10 mg tablet Take 10 mg by mouth once daily.      magnesium oxide (MAG-OX) 400 mg (241.3 mg magnesium) tablet Take 1 tablet (400 mg total) by mouth nightly. 30 tablet 5    montelukast (SINGULAIR) 10 mg tablet Take 10 mg by mouth once daily.      pantoprazole (PROTONIX) 20 MG tablet Take 1 tablet by mouth once daily.      TRELEGY ELLIPTA 200-62.5-25 mcg inhaler Inhale 1 puff into the lungs once daily.      doxycycline (MONODOX) 100 MG capsule Take 100 mg by mouth once daily. (Patient not taking: Reported on 4/12/2024)      ferrous gluconate (FERGON) 324 MG tablet Take 1 tablet by mouth once a week. (Patient not taking: Reported on 4/12/2024)      pramipexole (MIRAPEX) 0.25 MG tablet Take 1 tablet (0.25 mg total) by mouth every evening. (Patient not taking: Reported on 4/12/2024) 30 tablet 5     No facility-administered encounter medications on file as of 4/12/2024.       Physical Exam:  Vitals:    04/12/24 0943   BP: 119/76   BP Location: Right arm   Patient Position: Sitting   BP Method: Medium (Automatic)   Pulse: 61   Temp: 98.2 °F (36.8 °C)   TempSrc: Oral   Weight: 67.5 kg (148 lb 12.8 oz)   Height: 5' 10" (1.778 m)       Physical Exam   General: NAD, voice normal  Neuro: AAO, CN II - XII grossly intact  Head/ Face: NCAT, symmetric, sensations intact bilaterally  Eyes: EOMI, PERRL  Ears: externally normal with grossly normal hearing  AD: EAC patent, TM intact, no middle ear effusion, no retractions  AS: EAC patent, TM intact, no middle ear effusion, no retractions  Nose: bilateral nares patent, midline septum, no rhinorrhea, no external deformity, mild turbinate hypertrophy. Polypoid appearing tissue at level of middle turbinate on the left  OC/OP: MMM, no intraoral lesions, no trismus, edentulous (dentures removed), no uvular deviation, bilaterally symmetric soft palate elevation, palatoglossus and palatopharyngeal fold wnl; tonsils are " symmetric and 1+  Indirect laryngoscopy: deferred due to patient intolerance  Neck: soft, supple, no LAD, normal ROM, no thyromegaly  Respiratory: nonlabored, no wheezing, bilateral chest rise  Cardiovascular: RRR  Gastrointestinal: S NT ND  Skin: warm, no lesions  Musculoskeletal: 5/5 strength  Psych: Appropriate affect/mood     Pertinent Data:  ? LABS:  ? AUDIO:           ? PATH:      Imaging:   I personally reviewed the following images:        Assessment/Plan:  53 y.o. female with CRS, AR, nasal congestion, anosmia.   - NSI BID  - Follow with flonase + astelin BID (reviewed technique)  - D/c nasal decongestants  - Continue claritin, singulair  - Prednisone taper   - Augmentin BID x 10 days  - CT sinus   - RTC 6-8 wks on Res day    Ro Bellamy NP

## 2024-04-24 ENCOUNTER — HOSPITAL ENCOUNTER (OUTPATIENT)
Dept: RADIOLOGY | Facility: HOSPITAL | Age: 54
Discharge: HOME | End: 2024-04-24
Attending: NURSE PRACTITIONER
Payer: MEDICAID

## 2024-04-24 DIAGNOSIS — J32.9 CHRONIC SINUSITIS, UNSPECIFIED LOCATION: ICD-10-CM

## 2024-04-24 PROCEDURE — 70486 CT MAXILLOFACIAL W/O DYE: CPT | Mod: TC

## 2024-05-29 ENCOUNTER — OFFICE VISIT (OUTPATIENT)
Dept: OTOLARYNGOLOGY | Facility: CLINIC | Age: 54
End: 2024-05-29
Payer: MEDICAID

## 2024-05-29 VITALS — SYSTOLIC BLOOD PRESSURE: 108 MMHG | HEART RATE: 74 BPM | DIASTOLIC BLOOD PRESSURE: 72 MMHG | TEMPERATURE: 99 F

## 2024-05-29 DIAGNOSIS — R43.0 ANOSMIA: ICD-10-CM

## 2024-05-29 DIAGNOSIS — J01.91 ACUTE RECURRENT SINUSITIS, UNSPECIFIED LOCATION: ICD-10-CM

## 2024-05-29 DIAGNOSIS — H04.209 EPIPHORA, UNSPECIFIED LATERALITY: ICD-10-CM

## 2024-05-29 DIAGNOSIS — J32.9 CHRONIC SINUSITIS, UNSPECIFIED LOCATION: ICD-10-CM

## 2024-05-29 DIAGNOSIS — J30.9 ALLERGIC RHINITIS, UNSPECIFIED SEASONALITY, UNSPECIFIED TRIGGER: Primary | ICD-10-CM

## 2024-05-29 PROCEDURE — 99213 OFFICE O/P EST LOW 20 MIN: CPT | Mod: PBBFAC | Performed by: NURSE PRACTITIONER

## 2024-05-29 PROCEDURE — 99214 OFFICE O/P EST MOD 30 MIN: CPT | Mod: S$PBB,,, | Performed by: NURSE PRACTITIONER

## 2024-05-29 PROCEDURE — 1159F MED LIST DOCD IN RCRD: CPT | Mod: CPTII,,, | Performed by: NURSE PRACTITIONER

## 2024-05-29 PROCEDURE — 3074F SYST BP LT 130 MM HG: CPT | Mod: CPTII,,, | Performed by: NURSE PRACTITIONER

## 2024-05-29 PROCEDURE — 3078F DIAST BP <80 MM HG: CPT | Mod: CPTII,,, | Performed by: NURSE PRACTITIONER

## 2024-05-29 RX ORDER — LEVOFLOXACIN 750 MG/1
750 TABLET ORAL DAILY
Qty: 21 TABLET | Refills: 0 | Status: SHIPPED | OUTPATIENT
Start: 2024-05-29

## 2024-05-29 RX ORDER — LEVOFLOXACIN 750 MG/1
750 TABLET ORAL DAILY
Qty: 14 TABLET | Refills: 0 | Status: SHIPPED | OUTPATIENT
Start: 2024-05-29 | End: 2024-05-29

## 2024-05-29 NOTE — PROGRESS NOTES
[facility]  Otolaryngology Clinic Note    Sarah Saravia  YOB: 1970    Chief Complaint:   Chief Complaint   Patient presents with    referral: Allergic Rhinitis        HPI: 04/12/2024: 53 y.o. female referred for sinus concerns. Endorses nasal congestion, intermittent rhinorrhea and PND, infraorbital and frontal pressure, anosmia, ageusia, epiphora, & intermittent sneezing. States her symptoms began about 8 months ago and have been progressively worsening. Historically, her allergy symptoms are worse in the spring and summer. She takes claritin and singulair daily and uses flonase and astelin daily as needed. She has recently begun using OTC nasal decongestants prn for nasal congestion. Two weeks ago, she completed a 2 week course of doxycycline prescribed by pulmonologist, Dr. Wyatt. Reports he did an XR of her sinuses and told her she had significant dz. States abx helped for a short time while she was taking meds but symptoms have since returned. She has not been on any steroids. States she has a navage but only used it a few times because family members told her it was bad. Reports getting purulent appearing mucous in return when she used navage. Reports she sees pulmonologist because she had COPD and that this dx is frustrating because has never smoked.      5/29/24: States she completed abx & steroid taper prior to having CT scan. Feels her symptoms may have improved some with abx but feels they have worsened again recently. Has been doing navage 2-3x/week and using flonase in the morning. States she never tried astelin because she was afraid to use both sprays. Also continues to be bothered by itchy/watery eyes & states she feels she needs to have allergy testing.    ROS:   10-point review of systems negative except per HPI      Review of patient's allergies indicates:  No Known Allergies    Past Medical History:   Diagnosis Date    Arthritis     COPD (chronic  obstructive pulmonary disease)     GERD (gastroesophageal reflux disease) 2023    I take medicine for acid reflux    Rheumatoid arthritis 2020    Have aching pain in legs       Past Surgical History:   Procedure Laterality Date     SECTION         Social History     Socioeconomic History    Marital status:    Tobacco Use    Smoking status: Never     Passive exposure: Never    Smokeless tobacco: Never   Substance and Sexual Activity    Alcohol use: Yes     Alcohol/week: 1.0 standard drink of alcohol     Types: 1 Glasses of wine per week     Comment: monthly    Drug use: Never    Sexual activity: Yes     Partners: Male     Birth control/protection: Post-menopausal       Family History   Problem Relation Age of Onset    Heart disease Mother         My mom have a pace maker    Colon cancer Father     Stomach cancer Father     Breast cancer Sister     Arthritis Maternal Grandmother         My mom mom had arthritis    Heart disease Maternal Grandmother         She had a bad heart    Cancer Sister         My sister had breast cancer she had both of them remove    Cancer Maternal Aunt         My mom sister had breast cancer she had one remove       Outpatient Encounter Medications as of 2024   Medication Sig Dispense Refill    albuterol (ACCUNEB) 1.25 mg/3 mL Nebu Inhale 1 ampule into the lungs every 6 (six) hours as needed.      albuterol (PROVENTIL/VENTOLIN HFA) 90 mcg/actuation inhaler Inhale 2 puffs into the lungs every 4 to 6 hours as needed for Wheezing. 18 g 0    azelastine (ASTELIN) 137 mcg (0.1 %) nasal spray 2 sprays 2 (two) times daily.      cyproheptadine (PERIACTIN) 4 mg tablet Take 4 mg by mouth 2 (two) times daily.      diclofenac (VOLTAREN) 75 MG EC tablet Take 1 tablet (75 mg total) by mouth daily as needed (pain, after food). 30 tablet 5    ergocalciferol (ERGOCALCIFEROL) 50,000 unit Cap Take 1 capsule (50,000 Units total) by mouth every 7 days. 5 capsule 3     "fluticasone propionate (FLONASE) 50 mcg/actuation nasal spray 1 spray by Each Nostril route as needed.      gabapentin (NEURONTIN) 300 MG capsule Take 1 capsule (300 mg total) by mouth every evening. 30 capsule 5    hydroxychloroquine (PLAQUENIL) 200 mg tablet Take 1 tablet (200 mg total) by mouth once daily. TAKE 1 TABLET BY MOUTH TWICE DAILY WITH FOOD OR MILK 30 tablet 5    ibuprofen (ADVIL,MOTRIN) 600 MG tablet Take 600 mg by mouth every 8 (eight) hours as needed.      loratadine (CLARITIN) 10 mg tablet Take 10 mg by mouth once daily.      magnesium oxide (MAG-OX) 400 mg (241.3 mg magnesium) tablet Take 1 tablet (400 mg total) by mouth nightly. 30 tablet 5    montelukast (SINGULAIR) 10 mg tablet Take 10 mg by mouth once daily.      pantoprazole (PROTONIX) 20 MG tablet Take 1 tablet by mouth once daily.      TRELEGY ELLIPTA 200-62.5-25 mcg inhaler Inhale 1 puff into the lungs once daily.      doxycycline (MONODOX) 100 MG capsule Take 100 mg by mouth once daily. (Patient not taking: Reported on 4/12/2024)      ferrous gluconate (FERGON) 324 MG tablet Take 1 tablet by mouth once a week. (Patient not taking: Reported on 4/12/2024)      pramipexole (MIRAPEX) 0.25 MG tablet Take 1 tablet (0.25 mg total) by mouth every evening. (Patient not taking: Reported on 4/12/2024) 30 tablet 5     No facility-administered encounter medications on file as of 4/12/2024.       Physical Exam:  Vitals:    04/12/24 0943   BP: 119/76   BP Location: Right arm   Patient Position: Sitting   BP Method: Medium (Automatic)   Pulse: 61   Temp: 98.2 °F (36.8 °C)   TempSrc: Oral   Weight: 67.5 kg (148 lb 12.8 oz)   Height: 5' 10" (1.778 m)       Physical Exam   General: NAD, voice normal  Neuro: AAO, CN II - XII grossly intact  Head/ Face: NCAT, symmetric, sensations intact bilaterally  Eyes: EOMI, PERRL  Ears: externally normal with grossly normal hearing  AD: EAC patent, TM intact, no middle ear effusion, no retractions  AS: EAC patent, TM " intact, no middle ear effusion, no retractions  Nose: bilateral nares patent, midline septum, no rhinorrhea, no external deformity, mild turbinate hypertrophy. No evidence of polyposis on anterior rhinoscopy  OC/OP: MMM, no intraoral lesions, no trismus, edentulous, no uvular deviation, bilaterally symmetric soft palate elevation, palatoglossus and palatopharyngeal fold wnl; tonsils are symmetric and 1+  Indirect laryngoscopy: deferred due to patient intolerance  Neck: soft, supple, no LAD, normal ROM, no thyromegaly  Respiratory: nonlabored, no wheezing, bilateral chest rise  Cardiovascular: RRR  Gastrointestinal: S NT ND  Skin: warm, no lesions  Musculoskeletal: 5/5 strength  Psych: Appropriate affect/mood     Pertinent Data:  ? LABS:  ? AUDIO:           ? PATH:      Imaging:   I personally reviewed the following images:        Assessment/Plan:  53 y.o. female with CRS, AR, nasal congestion, anosmia. CT with extensive, acute sinusitis- reviewed with pt & Dr. Segal. Pt adamant that she wishes to avoid surgery.  - Navage BID  - Follow with flonase + astelin BID (reviewed technique)  - Continue claritin, singulair  - Levaquin daily x 3 weeks  - Daily probiotic or yogurt while on abx   - RAST  - RTC 6-8 wks on Res day    Ro Bellamy NP

## 2024-06-16 NOTE — ASSESSMENT & PLAN NOTE
Currently taking  mg daily (reduced to dose based on body weight)  Repeat YING 1:320 Speckled. TPO neg, haynes neg, SSA neg, SSB, Scl-70 negative, dsDNA neg, complements normal  No signs of Lupus     Reports she is currently taking Ibuprofen and not taking Diclofenac, she is unsure which one works better for her. She knows that she cannot take both of these medications at the same time.     Plaquenil Eye Exam: [facility] - reports recent completed April 2024     Labs reviewed from last visit. BRIANNA for eye exam  BRIANNA to pulmonologist and Allergist

## 2024-06-16 NOTE — PROGRESS NOTES
Subjective:           Patient ID: Sarah Saravia is a 53 y.o. female.    Chief Complaint: Follow-up (Right leg pain )       is a 54 y/o female here for a f./u. She was initially a self referral for joint poin and shortness of breath. She established care with Dr. Marshall on 11/5/2021 . Past labs: YING 1:80 Nucleolar, (from [facility]). And she was initiated on hydroxychloroquine. She was previously lost to follow up for over 1 year ([id number]) She was started on HCQ. She does report improvement in joint pain since restarting the medication. She was also started     Reports she previously did blood work for her PCP and was told she has a Fatty Liver? She did show an abdominal ultrasound result from [facility] on her phone which showed renal cysts, splenomegaly and mild hepatomegaly.     Followed by Pulmonologist - Dr. Wyatt for COPD  Followed by Allergist- Dr Wing- allergies    Denies history of fevers, rashes, photosensitivity, oral or nasal ulcers, h/o MI, stroke, seizures, h/o PE or DVT, Raynaud's phenomenon, uveitis, malignancies.     Today: Continues on HCQ. She is c/o of leg pain: Right leg pain is worse than left leg. Reports that the pain is intermittent (good for 2 days then gets pain for 1-2 days) describes it as tingling sensation starting from the foot up to the knee. The leg pain is occasionally associated with muscle cramping. Also reported localized knee pain with and without leg pain. Pain is improved with heating pad and Ibuprofen. She does report being out of the gabepentin for a few months. She did restart the Gabapentin 7 days with some relief. Continues to have lower back pain- reports she's never been to physical therapy. She reports recent referrals to gastro for fatty liver and pulmonoligst for COPD and also referred to ENT. Reports she stays active and she has been walking more than usual. She is aware that since Dr. Marshall is no longer at this clinic she will not receive any  "scheduled medications including Mirapex for RLS.    Today June 2024: Continues on  hydroxychloroquine 200 mg daily. Denies red/warm/swollen joints, morning stiffness, hair loss. Currently on Levaquin now for sinus infection. Reports it was recommended to have sinus surgery but she is not ready for that. Since last office visit she completed physical therapy for her lower back with improvement. She is very happy with the results. She continues to do home exercies and stretches with her daughter at home.        Review of Systems   Constitutional:  Negative for appetite change, chills and fever.   HENT:  Negative for congestion, ear pain, mouth sores, nosebleeds and trouble swallowing.    Eyes:  Negative for photophobia and discharge.   Respiratory:  Negative for chest tightness and shortness of breath.    Cardiovascular:  Negative for chest pain.   Gastrointestinal:  Negative for abdominal pain and vomiting.   Endocrine: Negative.    Genitourinary:  Negative for hematuria.   Musculoskeletal:  Positive for back pain and myalgias.        As per HPI  Improving  Occasional sciatic pain relived by ibuprofen  Occasional muscle cramping in right leg at night   Skin:  Negative for rash.   Neurological:  Negative for weakness.         Objective:   /79 (BP Location: Right arm, Patient Position: Sitting, BP Method: Medium (Automatic))   Pulse 80   Temp 98.3 °F (36.8 °C) (Oral)   Resp 18   Ht 5' 10" (1.778 m)   Wt 65.3 kg (144 lb)   SpO2 96%   BMI 20.66 kg/m²          Physical Exam   Constitutional: She is oriented to person, place, and time. She appears well-developed and well-nourished. No distress.   HENT:   Head: Normocephalic and atraumatic.   Right Ear: External ear normal.   Left Ear: External ear normal.   Eyes: Pupils are equal, round, and reactive to light.   Cardiovascular: Normal rate.   Pulmonary/Chest: Effort normal.   Abdominal: Soft. There is no abdominal tenderness.   Musculoskeletal:      Cervical " back: Neck supple.   Lymphadenopathy:     She has no cervical adenopathy.   Neurological: She is alert and oriented to person, place, and time. She displays normal reflexes. No cranial nerve deficit or sensory deficit. She exhibits normal muscle tone. Coordination normal.   Skin: No rash noted. No erythema.   Vitals reviewed.      Right Side Rheumatological Exam     Examination finds the 1st PIP, 1st MCP, 2nd PIP, 2nd MCP, 3rd PIP, 3rd MCP, 4th PIP, 4th MCP, 5th PIP and 5th MCP normal.    Left Side Rheumatological Exam     Examination finds the 1st PIP, 1st MCP, 2nd PIP, 2nd MCP, 3rd PIP, 3rd MCP, 4th PIP, 4th MCP, 5th PIP and 5th MCP normal.           No data to display     Assessment:         Medication List with Changes/Refills   Current Medications    ALBUTEROL (ACCUNEB) 1.25 MG/3 ML NEBU    Inhale 1 ampule into the lungs every 6 (six) hours as needed.       Start Date: --        End Date: --    ALBUTEROL (PROVENTIL/VENTOLIN HFA) 90 MCG/ACTUATION INHALER    Inhale 2 puffs into the lungs every 4 to 6 hours as needed for Wheezing.       Start Date: 9/14/2023 End Date: --    AZELASTINE (ASTELIN) 137 MCG (0.1 %) NASAL SPRAY    2 sprays 2 (two) times daily.       Start Date: 3/19/2024 End Date: --    CYPROHEPTADINE (PERIACTIN) 4 MG TABLET    Take 4 mg by mouth 2 (two) times daily.       Start Date: 6/5/2023  End Date: --    DICLOFENAC (VOLTAREN) 75 MG EC TABLET    Take 1 tablet (75 mg total) by mouth daily as needed (pain, after food).       Start Date: 9/8/2023  End Date: --    ERGOCALCIFEROL (ERGOCALCIFEROL) 50,000 UNIT CAP    Take 1 capsule (50,000 Units total) by mouth every 7 days.       Start Date: 6/14/2023 End Date: --    FLUTICASONE PROPIONATE (FLONASE) 50 MCG/ACTUATION NASAL SPRAY    1 spray by Each Nostril route as needed.       Start Date: 8/22/2023 End Date: --    IBUPROFEN (ADVIL,MOTRIN) 600 MG TABLET    Take 600 mg by mouth every 8 (eight) hours as needed.       Start Date: 2/19/2024 End Date: --     LEVOFLOXACIN (LEVAQUIN) 750 MG TABLET    Take 1 tablet (750 mg total) by mouth once daily.       Start Date: 5/29/2024 End Date: --    LORATADINE (CLARITIN) 10 MG TABLET    Take 10 mg by mouth once daily.       Start Date: --        End Date: --    MAGNESIUM OXIDE (MAG-OX) 400 MG (241.3 MG MAGNESIUM) TABLET    Take 1 tablet (400 mg total) by mouth nightly.       Start Date: 3/14/2024 End Date: --    MONTELUKAST (SINGULAIR) 10 MG TABLET    Take 10 mg by mouth once daily.       Start Date: 2/25/2024 End Date: --    PANTOPRAZOLE (PROTONIX) 20 MG TABLET    Take 1 tablet by mouth once daily.       Start Date: 1/23/2024 End Date: --    TRELEGY ELLIPTA 200-62.5-25 MCG INHALER    Inhale 1 puff into the lungs once daily.       Start Date: --        End Date: --   Changed and/or Refilled Medications    Modified Medication Previous Medication    GABAPENTIN (NEURONTIN) 300 MG CAPSULE gabapentin (NEURONTIN) 300 MG capsule       Take 1 capsule (300 mg total) by mouth every evening.    Take 1 capsule (300 mg total) by mouth every evening.       Start Date: 6/17/2024 End Date: --    Start Date: 3/7/2024  End Date: 6/17/2024    HYDROXYCHLOROQUINE (PLAQUENIL) 200 MG TABLET hydroxychloroquine (PLAQUENIL) 200 mg tablet       Take 1 tablet (200 mg total) by mouth once daily. TAKE 1 TABLET BY MOUTH TWICE DAILY WITH FOOD OR MILK    Take 1 tablet (200 mg total) by mouth once daily. TAKE 1 TABLET BY MOUTH TWICE DAILY WITH FOOD OR MILK       Start Date: 6/17/2024 End Date: --    Start Date: 3/14/2024 End Date: 6/17/2024   Discontinued Medications    DOXYCYCLINE (MONODOX) 100 MG CAPSULE    Take 100 mg by mouth once daily.       Start Date: 2/28/2024 End Date: 6/17/2024    FERROUS GLUCONATE (FERGON) 324 MG TABLET    Take 1 tablet by mouth once a week.       Start Date: --        End Date: 6/17/2024    PRAMIPEXOLE (MIRAPEX) 0.25 MG TABLET    Take 1 tablet (0.25 mg total) by mouth every evening.       Start Date: 9/8/2023  End Date: 6/17/2024          ICD-10-CM ICD-9-CM   1. Undifferentiated connective tissue disease  M35.9 710.9   2. Fibromyalgia  M79.7 729.1   3. Insomnia, unspecified type  G47.00 780.52   4. Chronic bilateral low back pain with right-sided sciatica  M54.41 724.2    G89.29 724.3     338.29   5. Chronic pain of right knee  M25.561 719.46    G89.29 338.29   6. Vitamin D deficiency  E55.9 268.9             Plan:       1. Undifferentiated connective tissue disease  Overview:  YING 1:320 Nucleolar  YING 1:160 Homogenous      Assessment & Plan:  Currently taking  mg daily (reduced to dose based on body weight)  Repeat YING 1:320 Speckled. TPO neg, haynes neg, SSA neg, SSB, Scl-70 negative, dsDNA neg, complements normal  No signs of Lupus     Reports she is currently taking Ibuprofen and not taking Diclofenac, she is unsure which one works better for her. She knows that she cannot take both of these medications at the same time.     Plaquenil Eye Exam: [facility] - reports recent completed April 2024     Labs reviewed from last visit. BRIANNA for eye exam  BRIANNA to pulmonologist and Allergist     Orders:  -     hydroxychloroquine (PLAQUENIL) 200 mg tablet; Take 1 tablet (200 mg total) by mouth once daily. TAKE 1 TABLET BY MOUTH TWICE DAILY WITH FOOD OR MILK  Dispense: 30 tablet; Refill: 5    2. Fibromyalgia  Assessment & Plan:  Continues with myalgia and bilateral leg pain. Reports she was out of Gabapentin for almost 3 months. She restarted Gabapentin 7 days ago with improvement.     Continue Gabapentin 300 mg nightly  Restart magnesium oxide 400 mg nightly     Fibromyalgia is a chronic pain syndrome.  Underlying causes of fibromyalgia may be numerous.  An underlying nonrestorative sleep pattern, mental and physical stressors play a role in pain perception.  It is important to decrease stress levels and improving sleep patterns/hygiene, exercise and a healthy life-style is important in management of this syndrome.     Orders:  -      gabapentin (NEURONTIN) 300 MG capsule; Take 1 capsule (300 mg total) by mouth every evening.  Dispense: 30 capsule; Refill: 5    3. Insomnia, unspecified type  Assessment & Plan:  Avoid caffeine, alcohol and stimulants.  Power down electronic devices at least one hour prior to bedtime.  Keep room dark; use eye mask or relaxation sound machine to promote rest.  Sleep hygiene refers to actions that tend to improve and maintain good sleep:  Sleep as long as necessary to feel rested (usually seven to eight hours for adults) and then get out of bed  Maintain a regular sleep schedule, particularly a regular wake-up time in the morning  Avoid smoking or other nicotine intake, particularly during the evening          4. Chronic bilateral low back pain with right-sided sciatica  Assessment & Plan:  3/14/24 Xray of lumbar spine completed- showed degenerative changes  She completed therapy with improvement.  She has increase ROM,   Encouraged to continue home exercises and stretching      5. Chronic pain of right knee  Assessment & Plan:  3/14/24 Xray of Right knee: FINDINGS: No acute displaced fractures or dislocations. Joint spaces preserved. No blastic or lytic lesions. Soft tissues within normal limits. Impression: No acute osseous abnormality.    Xray ordered at last visit and reviewed.   Continue home exercises      6. Vitamin D deficiency  Assessment & Plan:  Vit D was not checked. She has a wellness in November with PCP. She has been taking it for one year.  She can continue what she has and will not refill right now and will see what her Vit D level with her Primary care or if it was drawn with her recent labs with allergist.               30 minutes of total time spent on the encounter, which includes face to face time and non-face to face time preparing to see the patient (eg, review of tests), Obtaining and/or reviewing separately obtained history, Documenting clinical information in the electronic or other health  record, Independently interpreting results (not separately reported) and communicating results to the patient/family/caregiver, or Care coordination (not separately reported).

## 2024-06-17 ENCOUNTER — OFFICE VISIT (OUTPATIENT)
Dept: RHEUMATOLOGY | Facility: CLINIC | Age: 54
End: 2024-06-17
Payer: MEDICAID

## 2024-06-17 VITALS
WEIGHT: 144 LBS | RESPIRATION RATE: 18 BRPM | TEMPERATURE: 98 F | BODY MASS INDEX: 20.62 KG/M2 | HEIGHT: 70 IN | OXYGEN SATURATION: 96 % | SYSTOLIC BLOOD PRESSURE: 125 MMHG | HEART RATE: 80 BPM | DIASTOLIC BLOOD PRESSURE: 79 MMHG

## 2024-06-17 DIAGNOSIS — G89.29 CHRONIC PAIN OF RIGHT KNEE: ICD-10-CM

## 2024-06-17 DIAGNOSIS — E55.9 VITAMIN D DEFICIENCY: ICD-10-CM

## 2024-06-17 DIAGNOSIS — M35.9 UNDIFFERENTIATED CONNECTIVE TISSUE DISEASE: ICD-10-CM

## 2024-06-17 DIAGNOSIS — M25.561 CHRONIC PAIN OF RIGHT KNEE: ICD-10-CM

## 2024-06-17 DIAGNOSIS — G89.29 CHRONIC BILATERAL LOW BACK PAIN WITH RIGHT-SIDED SCIATICA: ICD-10-CM

## 2024-06-17 DIAGNOSIS — M35.9 UNDIFFERENTIATED CONNECTIVE TISSUE DISEASE: Primary | ICD-10-CM

## 2024-06-17 DIAGNOSIS — M54.41 CHRONIC BILATERAL LOW BACK PAIN WITH RIGHT-SIDED SCIATICA: ICD-10-CM

## 2024-06-17 DIAGNOSIS — G47.00 INSOMNIA, UNSPECIFIED TYPE: ICD-10-CM

## 2024-06-17 DIAGNOSIS — M79.7 FIBROMYALGIA: ICD-10-CM

## 2024-06-17 PROCEDURE — 1159F MED LIST DOCD IN RCRD: CPT | Mod: CPTII,,,

## 2024-06-17 PROCEDURE — 3078F DIAST BP <80 MM HG: CPT | Mod: CPTII,,,

## 2024-06-17 PROCEDURE — 3074F SYST BP LT 130 MM HG: CPT | Mod: CPTII,,,

## 2024-06-17 PROCEDURE — 99999 PR PBB SHADOW E&M-EST. PATIENT-LVL IV: CPT | Mod: PBBFAC,,,

## 2024-06-17 PROCEDURE — 3008F BODY MASS INDEX DOCD: CPT | Mod: CPTII,,,

## 2024-06-17 PROCEDURE — 99214 OFFICE O/P EST MOD 30 MIN: CPT | Mod: S$PBB,,,

## 2024-06-17 PROCEDURE — 99214 OFFICE O/P EST MOD 30 MIN: CPT | Mod: PBBFAC

## 2024-06-17 RX ORDER — HYDROXYCHLOROQUINE SULFATE 200 MG/1
200 TABLET, FILM COATED ORAL DAILY
Qty: 30 TABLET | Refills: 5 | Status: SHIPPED | OUTPATIENT
Start: 2024-06-17 | End: 2024-06-17 | Stop reason: SDUPTHER

## 2024-06-17 RX ORDER — GABAPENTIN 300 MG/1
300 CAPSULE ORAL NIGHTLY
Qty: 30 CAPSULE | Refills: 5 | Status: SHIPPED | OUTPATIENT
Start: 2024-06-17

## 2024-06-17 RX ORDER — HYDROXYCHLOROQUINE SULFATE 200 MG/1
200 TABLET, FILM COATED ORAL DAILY
Qty: 30 TABLET | Refills: 5 | Status: SHIPPED | OUTPATIENT
Start: 2024-06-17

## 2024-06-17 NOTE — ASSESSMENT & PLAN NOTE
3/14/24 Xray of Right knee: FINDINGS: No acute displaced fractures or dislocations. Joint spaces preserved. No blastic or lytic lesions. Soft tissues within normal limits. Impression: No acute osseous abnormality.    Xray ordered at last visit and reviewed.   Continue home exercises

## 2024-06-17 NOTE — ASSESSMENT & PLAN NOTE
Vit D was not checked. She has a wellness in November with PCP. She has been taking it for one year.  She can continue what she has and will not refill right now and will see what her Vit D level with her Primary care or if it was drawn with her recent labs with allergist.

## 2024-06-17 NOTE — ASSESSMENT & PLAN NOTE
3/14/24 Xray of lumbar spine completed- showed degenerative changes  She completed therapy with improvement.  She has increase ROM,   Encouraged to continue home exercises and stretching

## 2024-06-18 ENCOUNTER — TELEPHONE (OUTPATIENT)
Dept: RHEUMATOLOGY | Facility: CLINIC | Age: 54
End: 2024-06-18
Payer: MEDICAID

## 2024-06-18 NOTE — TELEPHONE ENCOUNTER
Progress notes from eye clinic and cardiology notes and echo has been scanned into the patients chart. Please Advise. Thanks

## 2024-06-18 NOTE — TELEPHONE ENCOUNTER
Noted; pulmonary notes received and reviewed and Opthmo notes reviewed. We did not request cardiology notes.

## 2024-11-15 NOTE — ASSESSMENT & PLAN NOTE
Currently taking  mg daily (reduced to dose based on body weight)  Repeat YING 1:320 Speckled. TPO neg, Ontiveros neg, SSA neg, SSB, Scl-70 negative, dsDNA neg, complements normal  No signs of Lupus     Reports she usually takes Ibuprofen (instead of diclofenac) but just ran out so will start taking Diclofenac, (she is unsure which one works better for her). She knows that she cannot take both of these medications at the same time.      Plaquenil Eye Exam: [facility] - completed May 2024 (scanned into media)     Labs completed last month with PCP- BRIANNA to request those labs  Followed by Pulmonology- interstitial pulmonary disease

## 2024-11-15 NOTE — ASSESSMENT & PLAN NOTE
Stable-Improvement in myalgia and bilateral leg pain.       Continue Gabapentin 300 mg nightly  Continue magnesium oxide 400 mg nightly     Fibromyalgia is a chronic pain syndrome.  Underlying causes of fibromyalgia may be numerous.  An underlying nonrestorative sleep pattern, mental and physical stressors play a role in pain perception.  It is important to decrease stress levels and improving sleep patterns/hygiene, exercise and a healthy life-style is important in management of this syndrome.

## 2024-11-15 NOTE — PROGRESS NOTES
Subjective:           Patient ID: Sarah Saravia is a 54 y.o. female.    Chief Complaint: Follow-up and Pain (Aching pain in left arm near elbow)       is a 52 y/o female here for a f./u. She was initially a self referral for joint poin and shortness of breath. She established care with Dr. Marshall on 11/5/2021 . Past labs: YING 1:80 Nucleolar, (from [facility]). And she was initiated on hydroxychloroquine. She was previously lost to follow up for over 1 year ([id number]) She was started on HCQ. She does report improvement in joint pain since restarting the medication. She was also started     Reports she previously did blood work for her PCP and was told she has a Fatty Liver? She did show an abdominal ultrasound result from [facility] on her phone which showed renal cysts, splenomegaly and mild hepatomegaly.     Followed by Pulmonologist - Dr. Wyatt for COPD  Followed by Allergist- Dr Wing- allergies    Denies history of fevers, rashes, photosensitivity, oral or nasal ulcers, h/o MI, stroke, seizures, h/o PE or DVT, Raynaud's phenomenon, uveitis, malignancies.     Continues on HCQ. She is c/o of leg pain: Right leg pain is worse than left leg. Reports that the pain is intermittent (good for 2 days then gets pain for 1-2 days) describes it as tingling sensation starting from the foot up to the knee. The leg pain is occasionally associated with muscle cramping. Also reported localized knee pain with and without leg pain. Pain is improved with heating pad and Ibuprofen. She does report being out of the gabepentin for a few months. She did restart the Gabapentin 7 days with some relief. Continues to have lower back pain- reports she's never been to physical therapy. She reports recent referrals to gastro for fatty liver and pulmonoligst for COPD and also referred to ENT. Reports she stays active and she has been walking more than usual. She is aware that since Dr. Marshall is no longer at this clinic she  "will not receive any scheduled medications including Mirapex for RLS.    June 2024: Continues on  hydroxychloroquine 200 mg daily. Denies red/warm/swollen joints, morning stiffness, hair loss. Currently on Levaquin now for sinus infection. Reports it was recommended to have sinus surgery but she is not ready for that. Since last office visit she completed physical therapy for her lower back with improvement. She is very happy with the results. She continues to do home exercies and stretches with her daughter at home.     Today November 2024: Continues on Hydroxychloroquine 200 mg daily. Denies morning stiffness, rash, hair loss, ulcers in nose or mouth. She does report new onset (about 2 weeks ago) left elbow pain. Reports aching is not in joint but on anterior surface of elbow. Denies injury or increased use. She does get relief with Tylenol and Ibuprofen. Does report increase sun exposure and a dark spot on her left arm that she will discuss with her PCP. She will try to obtain new PCP in the near future, instructed to call if she needs referral to a PCP.        Review of Systems   Constitutional:  Negative for appetite change, chills and fever.   HENT:  Negative for congestion, ear pain, mouth sores, nosebleeds and trouble swallowing.    Eyes:  Negative for photophobia and discharge.   Respiratory:  Negative for chest tightness and shortness of breath.    Cardiovascular:  Negative for chest pain.   Gastrointestinal:  Negative for abdominal pain and vomiting.   Endocrine: Negative.    Genitourinary:  Negative for hematuria.   Musculoskeletal:  Positive for back pain.        As per HPI  Anterior surface of left elbow   Skin:  Negative for rash.   Neurological:  Negative for weakness.         Objective:   /80 (BP Location: Right arm, Patient Position: Sitting)   Pulse 66   Temp 98 °F (36.7 °C) (Oral)   Resp 18   Ht 5' 10" (1.778 m)   Wt 68.5 kg (151 lb)   SpO2 99%   BMI 21.67 kg/m²        Physical Exam "   Constitutional: She is oriented to person, place, and time. She appears well-developed and well-nourished. No distress.   HENT:   Head: Normocephalic and atraumatic.   Right Ear: External ear normal.   Left Ear: External ear normal.   Eyes: Pupils are equal, round, and reactive to light.   Cardiovascular: Normal rate.   Pulmonary/Chest: Effort normal.   Abdominal: Soft. There is no abdominal tenderness.   Musculoskeletal:      Cervical back: Neck supple.   Lymphadenopathy:     She has no cervical adenopathy.   Neurological: She is alert and oriented to person, place, and time. She displays normal reflexes. No cranial nerve deficit or sensory deficit. She exhibits normal muscle tone. Coordination normal.   Skin: No rash noted. No erythema.   Vitals reviewed.      Right Side Rheumatological Exam     Examination finds the 1st PIP, 1st MCP, 2nd PIP, 2nd MCP, 3rd PIP, 3rd MCP, 4th PIP, 4th MCP, 5th PIP and 5th MCP normal.    Left Side Rheumatological Exam     Examination finds the 1st PIP, 1st MCP, 2nd PIP, 2nd MCP, 3rd PIP, 3rd MCP, 4th PIP, 4th MCP, 5th PIP and 5th MCP normal.         No data to display     Assessment:       Medication List with Changes/Refills   Current Medications    ALBUTEROL (ACCUNEB) 1.25 MG/3 ML NEBU    Inhale 1 ampule into the lungs every 6 (six) hours as needed.       Start Date: --        End Date: --    ALBUTEROL (PROVENTIL/VENTOLIN HFA) 90 MCG/ACTUATION INHALER    Inhale 2 puffs into the lungs every 4 to 6 hours as needed for Wheezing.       Start Date: 9/14/2023 End Date: --    AZELASTINE (ASTELIN) 137 MCG (0.1 %) NASAL SPRAY    2 sprays 2 (two) times daily.       Start Date: 3/19/2024 End Date: --    CYPROHEPTADINE (PERIACTIN) 4 MG TABLET    Take 4 mg by mouth 2 (two) times daily.       Start Date: 6/5/2023  End Date: --    DICLOFENAC (VOLTAREN) 75 MG EC TABLET    Take 1 tablet (75 mg total) by mouth daily as needed (pain, after food).       Start Date: 9/8/2023  End Date: --     FLUTICASONE PROPIONATE (FLONASE) 50 MCG/ACTUATION NASAL SPRAY    1 spray by Each Nostril route as needed.       Start Date: 8/22/2023 End Date: --    IBUPROFEN (ADVIL,MOTRIN) 600 MG TABLET    Take 600 mg by mouth every 8 (eight) hours as needed.       Start Date: 2/19/2024 End Date: --    LORATADINE (CLARITIN) 10 MG TABLET    Take 10 mg by mouth once daily.       Start Date: --        End Date: --    MONTELUKAST (SINGULAIR) 10 MG TABLET    Take 10 mg by mouth once daily.       Start Date: 2/25/2024 End Date: --    PANTOPRAZOLE (PROTONIX) 20 MG TABLET    Take 1 tablet by mouth once daily.       Start Date: 1/23/2024 End Date: --    TRELEGY ELLIPTA 200-62.5-25 MCG INHALER    Inhale 1 puff into the lungs once daily.       Start Date: --        End Date: --   Changed and/or Refilled Medications    Modified Medication Previous Medication    GABAPENTIN (NEURONTIN) 300 MG CAPSULE gabapentin (NEURONTIN) 300 MG capsule       Take 1 capsule (300 mg total) by mouth every evening.    Take 1 capsule (300 mg total) by mouth every evening.       Start Date: 11/18/2024End Date: --    Start Date: 6/17/2024 End Date: 11/18/2024    HYDROXYCHLOROQUINE (PLAQUENIL) 200 MG TABLET hydroxychloroquine (PLAQUENIL) 200 mg tablet       Take 1 tablet (200 mg total) by mouth once daily.    Take 1 tablet (200 mg total) by mouth once daily.       Start Date: 11/18/2024End Date: --    Start Date: 6/17/2024 End Date: 11/18/2024    MAGNESIUM OXIDE (MAG-OX) 400 MG (241.3 MG MAGNESIUM) TABLET magnesium oxide (MAG-OX) 400 mg (241.3 mg magnesium) tablet       Take 1 tablet (400 mg total) by mouth nightly.    Take 1 tablet (400 mg total) by mouth nightly.       Start Date: 11/18/2024End Date: --    Start Date: 3/14/2024 End Date: 11/18/2024   Discontinued Medications    ERGOCALCIFEROL (ERGOCALCIFEROL) 50,000 UNIT CAP    Take 1 capsule (50,000 Units total) by mouth every 7 days.       Start Date: 6/14/2023 End Date: 11/18/2024    LEVOFLOXACIN (LEVAQUIN) 750  MG TABLET    Take 1 tablet (750 mg total) by mouth once daily.       Start Date: 5/29/2024 End Date: 11/18/2024         ICD-10-CM ICD-9-CM   1. Undifferentiated connective tissue disease  M35.9 710.9   2. Fibromyalgia  M79.7 729.1   3. Interstitial pulmonary disease  J84.9 515   4. Chronic pain of right knee  M25.561 719.46    G89.29 338.29             Plan:       1. Undifferentiated connective tissue disease  Overview:  YING 1:320 Nucleolar  YING 1:160 Homogenous      Assessment & Plan:  Currently taking  mg daily (reduced to dose based on body weight)  Repeat YING 1:320 Speckled. TPO neg, Ontiveros neg, SSA neg, SSB, Scl-70 negative, dsDNA neg, complements normal  No signs of Lupus     Reports she usually takes Ibuprofen (instead of diclofenac) but just ran out so will start taking Diclofenac, (she is unsure which one works better for her). She knows that she cannot take both of these medications at the same time.      Plaquenil Eye Exam: [facility] - completed May 2024 (scanned into media)     Labs completed last month with PCP- BRIANNA to request those labs  Followed by Pulmonology- interstitial pulmonary disease    Orders:  -     hydroxychloroquine (PLAQUENIL) 200 mg tablet; Take 1 tablet (200 mg total) by mouth once daily.  Dispense: 30 tablet; Refill: 5    2. Fibromyalgia  Assessment & Plan:  Stable-Improvement in myalgia and bilateral leg pain.       Continue Gabapentin 300 mg nightly  Continue magnesium oxide 400 mg nightly     Fibromyalgia is a chronic pain syndrome.  Underlying causes of fibromyalgia may be numerous.  An underlying nonrestorative sleep pattern, mental and physical stressors play a role in pain perception.  It is important to decrease stress levels and improving sleep patterns/hygiene, exercise and a healthy life-style is important in management of this syndrome.     Orders:  -     gabapentin (NEURONTIN) 300 MG capsule; Take 1 capsule (300 mg total) by mouth every evening.  Dispense:  30 capsule; Refill: 5  -     magnesium oxide (MAG-OX) 400 mg (241.3 mg magnesium) tablet; Take 1 tablet (400 mg total) by mouth nightly.  Dispense: 30 tablet; Refill: 5    3. Interstitial pulmonary disease  Assessment & Plan:  Denies any increase in symptoms or SOB. Reports improvement since starting Trelegy.   Followed by Pulmonologist- Dr. Wyatt  Last PFT 2/28/24- scanned into media      4. Chronic pain of right knee  Assessment & Plan:  3/14/24 Xray of Right knee: FINDINGS: No acute displaced fractures or dislocations. Joint spaces preserved. No blastic or lytic lesions. Soft tissues within normal limits. Impression: No acute osseous abnormality.     Xray reviewed.   Continue home exercises                410 minutes of total time spent on the encounter, which includes face to face time and non-face to face time preparing to see the patient (eg, review of tests), Obtaining and/or reviewing separately obtained history, Documenting clinical information in the electronic or other health record, Independently interpreting results (not separately reported) and communicating results to the patient/family/caregiver, or Care coordination (not separately reported).

## 2024-11-18 ENCOUNTER — OFFICE VISIT (OUTPATIENT)
Dept: RHEUMATOLOGY | Facility: CLINIC | Age: 54
End: 2024-11-18
Payer: MEDICARE

## 2024-11-18 VITALS
HEIGHT: 70 IN | WEIGHT: 151 LBS | DIASTOLIC BLOOD PRESSURE: 80 MMHG | RESPIRATION RATE: 18 BRPM | OXYGEN SATURATION: 99 % | BODY MASS INDEX: 21.62 KG/M2 | HEART RATE: 66 BPM | TEMPERATURE: 98 F | SYSTOLIC BLOOD PRESSURE: 120 MMHG

## 2024-11-18 DIAGNOSIS — G89.29 CHRONIC PAIN OF RIGHT KNEE: ICD-10-CM

## 2024-11-18 DIAGNOSIS — M79.7 FIBROMYALGIA: ICD-10-CM

## 2024-11-18 DIAGNOSIS — M35.9 UNDIFFERENTIATED CONNECTIVE TISSUE DISEASE: Primary | ICD-10-CM

## 2024-11-18 DIAGNOSIS — J84.9 INTERSTITIAL PULMONARY DISEASE: ICD-10-CM

## 2024-11-18 DIAGNOSIS — M25.561 CHRONIC PAIN OF RIGHT KNEE: ICD-10-CM

## 2024-11-18 PROBLEM — J45.909 ASTHMA: Status: ACTIVE | Noted: 2024-11-18

## 2024-11-18 PROCEDURE — 3074F SYST BP LT 130 MM HG: CPT | Mod: CPTII,S$GLB,,

## 2024-11-18 PROCEDURE — 3008F BODY MASS INDEX DOCD: CPT | Mod: CPTII,S$GLB,,

## 2024-11-18 PROCEDURE — 99999 PR PBB SHADOW E&M-EST. PATIENT-LVL IV: CPT | Mod: PBBFAC,,,

## 2024-11-18 PROCEDURE — 3079F DIAST BP 80-89 MM HG: CPT | Mod: CPTII,S$GLB,,

## 2024-11-18 PROCEDURE — 1159F MED LIST DOCD IN RCRD: CPT | Mod: CPTII,S$GLB,,

## 2024-11-18 PROCEDURE — 99215 OFFICE O/P EST HI 40 MIN: CPT | Mod: S$GLB,,,

## 2024-11-18 RX ORDER — GABAPENTIN 300 MG/1
300 CAPSULE ORAL NIGHTLY
Qty: 30 CAPSULE | Refills: 5 | Status: SHIPPED | OUTPATIENT
Start: 2024-11-18

## 2024-11-18 RX ORDER — LANOLIN ALCOHOL/MO/W.PET/CERES
400 CREAM (GRAM) TOPICAL NIGHTLY
Qty: 30 TABLET | Refills: 5 | Status: SHIPPED | OUTPATIENT
Start: 2024-11-18

## 2024-11-18 RX ORDER — HYDROXYCHLOROQUINE SULFATE 200 MG/1
200 TABLET, FILM COATED ORAL DAILY
Qty: 30 TABLET | Refills: 5 | Status: SHIPPED | OUTPATIENT
Start: 2024-11-18

## 2024-11-18 NOTE — ASSESSMENT & PLAN NOTE
Denies any increase in symptoms or SOB. Reports improvement since starting Trelegy.   Followed by Pulmonologist- Dr. Wyatt  Last PFT 2/28/24- scanned into media

## 2024-11-18 NOTE — ASSESSMENT & PLAN NOTE
3/14/24 Xray of Right knee: FINDINGS: No acute displaced fractures or dislocations. Joint spaces preserved. No blastic or lytic lesions. Soft tissues within normal limits. Impression: No acute osseous abnormality.     Xray reviewed.   Continue home exercises

## 2025-04-17 NOTE — ASSESSMENT & PLAN NOTE
Currently taking  mg daily (reduced to dose based on body weight)  Repeat YING 1:320 Speckled. TPO neg, Ontiveros neg, SSA neg, SSB, Scl-70 negative, dsDNA neg, complements normal  No signs of Lupus     Continue Hydroxychloroquine 200 mg daily  Plaquenil Eye Exam: [facility]      Labs completed last week with PCP- BRIANNA to request those labs  Followed by Pulmonary- interstitial pulmonary disease

## 2025-04-17 NOTE — ASSESSMENT & PLAN NOTE
Reports always had bad allergies and developed Asthma in her 30's. She was never a smoker but exposed to second hand smoke. ( smoked and now children smoke) She is still exposed to second hand smoke.     Stable pulmonary symptoms. Denies SOB at rest. Does endorse intermittent SOB with exertion. Reports improvement since starting Trelegy (prescribed by Allergist)but is now having intermittent raspy voice.   Followed by Pulmonologist- Dr. Wyatt  Requested last office note/CT/PFT from pulmonary- last CT scan of chest from 2020. Patient reports she feels current pulmonologist is not helping her and does not explain with her, although she is stable.  Serologies negative for Lupus. RF and CCP negative. Anti- Scl-70 and RNA polymerase III negative    Order CT scan of chest- last CT from 2020  Patient is requesting a referral to a different pulmonologist- refer to [facility] after completion of CT chest.

## 2025-04-21 ENCOUNTER — OFFICE VISIT (OUTPATIENT)
Dept: RHEUMATOLOGY | Facility: CLINIC | Age: 55
End: 2025-04-21
Payer: MEDICARE

## 2025-04-21 VITALS
WEIGHT: 147.63 LBS | HEIGHT: 70 IN | BODY MASS INDEX: 21.13 KG/M2 | HEART RATE: 70 BPM | OXYGEN SATURATION: 98 % | RESPIRATION RATE: 18 BRPM | SYSTOLIC BLOOD PRESSURE: 117 MMHG | TEMPERATURE: 98 F | DIASTOLIC BLOOD PRESSURE: 80 MMHG

## 2025-04-21 DIAGNOSIS — M19.041 OSTEOARTHRITIS OF BOTH HANDS, UNSPECIFIED OSTEOARTHRITIS TYPE: ICD-10-CM

## 2025-04-21 DIAGNOSIS — M79.7 FIBROMYALGIA: ICD-10-CM

## 2025-04-21 DIAGNOSIS — M79.641 PAIN IN BOTH HANDS: ICD-10-CM

## 2025-04-21 DIAGNOSIS — J84.9 INTERSTITIAL PULMONARY DISEASE: ICD-10-CM

## 2025-04-21 DIAGNOSIS — M19.042 OSTEOARTHRITIS OF BOTH HANDS, UNSPECIFIED OSTEOARTHRITIS TYPE: ICD-10-CM

## 2025-04-21 DIAGNOSIS — J98.4 RESTRICTIVE LUNG DISEASE: ICD-10-CM

## 2025-04-21 DIAGNOSIS — R76.8 POSITIVE ANA (ANTINUCLEAR ANTIBODY): Primary | ICD-10-CM

## 2025-04-21 DIAGNOSIS — M79.642 PAIN IN BOTH HANDS: ICD-10-CM

## 2025-04-21 PROBLEM — M79.643 HAND PAIN: Status: ACTIVE | Noted: 2025-04-21

## 2025-04-21 PROCEDURE — 1159F MED LIST DOCD IN RCRD: CPT | Mod: CPTII,S$GLB,,

## 2025-04-21 PROCEDURE — 99999 PR PBB SHADOW E&M-EST. PATIENT-LVL V: CPT | Mod: PBBFAC,,,

## 2025-04-21 PROCEDURE — 3008F BODY MASS INDEX DOCD: CPT | Mod: CPTII,S$GLB,,

## 2025-04-21 PROCEDURE — 3079F DIAST BP 80-89 MM HG: CPT | Mod: CPTII,S$GLB,,

## 2025-04-21 PROCEDURE — 99215 OFFICE O/P EST HI 40 MIN: CPT | Mod: S$GLB,,,

## 2025-04-21 PROCEDURE — 3074F SYST BP LT 130 MM HG: CPT | Mod: CPTII,S$GLB,,

## 2025-04-21 RX ORDER — HYDROXYCHLOROQUINE SULFATE 200 MG/1
200 TABLET, FILM COATED ORAL DAILY
Qty: 30 TABLET | Refills: 5 | Status: SHIPPED | OUTPATIENT
Start: 2025-04-21

## 2025-04-21 RX ORDER — ATORVASTATIN CALCIUM 20 MG/1
20 TABLET, FILM COATED ORAL NIGHTLY
COMMUNITY
Start: 2025-04-11

## 2025-04-21 RX ORDER — DICLOFENAC SODIUM 16.05 MG/ML
40 SOLUTION TOPICAL 4 TIMES DAILY PRN
Qty: 150 ML | Refills: 5 | Status: SHIPPED | OUTPATIENT
Start: 2025-04-21

## 2025-04-21 NOTE — PROGRESS NOTES
Subjective:           Patient ID: Sarah Saravia is a 54 y.o. female.    Chief Complaint: Follow-up       is a 54 y/o female here for a f./u. She was initially a self referral for joint poin and shortness of breath. She established care with Dr. Marshall on 11/5/2021 . Past labs: YING 1:80 Nucleolar, (from [facility]). And she was initiated on hydroxychloroquine. She was previously lost to follow up for over 1 year ([id number]) She was started on HCQ. She does report improvement in joint pain since restarting the medication.     Reports she previously did blood work for her PCP and was told she has a Fatty Liver? She did show an abdominal ultrasound result from [facility] on her phone which showed renal cysts, splenomegaly and mild hepatomegaly.   Hx of Always had bad allergies.developed Asthma  in 30's.   Copd vs restrictive lung disease?      Followed by Pulmonologist - Dr. Wyatt for COPD? COPD vs restrictive lung disease, Interstitial Pulmonary Disease    Followed by Allergist- Dr Wing- allergies    Denies history of fevers, rashes, photosensitivity, oral or nasal ulcers, h/o MI, stroke, seizures, h/o PE or DVT, Raynaud's phenomenon, uveitis, malignancies.   Hx: never a smoker. Exposed to second hand smoke with  and kids for many year. She is currently still exposed to second hand smoke.     Continues on HCQ. She is c/o of leg pain: Right leg pain is worse than left leg. Reports that the pain is intermittent (good for 2 days then gets pain for 1-2 days) describes it as tingling sensation starting from the foot up to the knee. The leg pain is occasionally associated with muscle cramping. Also reported localized knee pain with and without leg pain. Pain is improved with heating pad and Ibuprofen. She does report being out of the gabepentin for a few months. She did restart the Gabapentin 7 days with some relief. Continues to have lower back pain- reports she's never been to physical therapy. She  reports recent referrals to gastro for fatty liver and pulmonoligst for COPD and also referred to ENT. Reports she stays active and she has been walking more than usual. She is aware that since Dr. Marshall is no longer at this clinic she will not receive any scheduled medications including Mirapex for RLS.    June 2024: Continues on  hydroxychloroquine 200 mg daily. Denies red/warm/swollen joints, morning stiffness, hair loss. Currently on Levaquin now for sinus infection. Reports it was recommended to have sinus surgery but she is not ready for that. Since last office visit she completed physical therapy for her lower back with improvement. She is very happy with the results. She continues to do home exercies and stretches with her daughter at home.     Today November 2024: Continues on Hydroxychloroquine 200 mg daily. Denies morning stiffness, rash, hair loss, ulcers in nose or mouth. She does report new onset (about 2 weeks ago) left elbow pain. Reports aching is not in joint but on anterior surface of elbow. Denies injury or increased use. She does get relief with Tylenol and Ibuprofen. Does report increase sun exposure and a dark spot on her left arm that she will discuss with her PCP. She will try to obtain new PCP in the near future, instructed to call if she needs referral to a PCP.     Today April 2025: Continues on Hydroxychloroquine 200 mg daily. Denies morning stiffness, rash, hair loss, ulcers in nose or mouth. She does endorse pain in DIP of right 5th finger with changes noted. She does report stable pulmonary symptoms. Denies SOB at rest, but gets SOB intermittent with exertion. Reports Trelegy has been helpful but has began with a raspy voice when she starts to talk. Eval by Dr Wing last month and pay consider changing inhaler in the future. She is followed by Pulmonary but she is requesting a referral to a new pulmonologist. She reports her last CT of lung is many years ago and she feels like he  "wants to discharge her from his clinic because her symptoms are stable but would like to maintain care with pulmonology. Reports labs completed with PCP last week. Denies any recent infections.       Review of Systems   Constitutional:  Negative for appetite change, chills and fever.   HENT:  Negative for congestion, ear pain, mouth sores, nosebleeds and trouble swallowing.    Eyes:  Negative for photophobia and discharge.   Respiratory:  Negative for chest tightness and shortness of breath.    Cardiovascular:  Negative for chest pain.   Gastrointestinal:  Negative for abdominal pain and vomiting.   Endocrine: Negative.    Genitourinary:  Negative for hematuria.   Musculoskeletal:  Positive for back pain.        As per HPI  Anterior surface of left elbow   Skin:  Negative for rash.   Neurological:  Negative for weakness.         Objective:   /80 (BP Location: Left arm, Patient Position: Sitting)   Pulse 70   Temp 98.4 °F (36.9 °C) (Oral)   Resp 18   Ht 5' 10" (1.778 m)   Wt 67 kg (147 lb 9.6 oz)   SpO2 98%   BMI 21.18 kg/m²        Physical Exam   Constitutional: She is oriented to person, place, and time. She appears well-developed and well-nourished. No distress.   HENT:   Head: Normocephalic and atraumatic.   Right Ear: External ear normal.   Left Ear: External ear normal.   Eyes: Pupils are equal, round, and reactive to light.   Cardiovascular: Normal rate.   Pulmonary/Chest: Effort normal.   Abdominal: Soft. There is no abdominal tenderness.   Musculoskeletal:      Cervical back: Neck supple.   Lymphadenopathy:     She has no cervical adenopathy.   Neurological: She is alert and oriented to person, place, and time. She displays normal reflexes. No cranial nerve deficit or sensory deficit. She exhibits normal muscle tone. Coordination normal.   Skin: No rash noted. No erythema.   Vitals reviewed.      Right Side Rheumatological Exam     Examination finds the 1st PIP, 1st MCP, 2nd PIP, 2nd MCP, 3rd " PIP, 3rd MCP, 4th PIP, 4th MCP, 5th PIP and 5th MCP normal.    Left Side Rheumatological Exam     Examination finds the 1st PIP, 1st MCP, 2nd PIP, 2nd MCP, 3rd PIP, 3rd MCP, 4th PIP, 4th MCP, 5th PIP and 5th MCP normal.         No data to display     Assessment:       Medication List with Changes/Refills   New Medications    DICLOFENAC SODIUM 1.5 % DROP    Apply 40 drops topically 4 (four) times daily as needed (for pain). To the affected areas. Spread evenly around joint       Start Date: 4/21/2025 End Date: --   Current Medications    ALBUTEROL (ACCUNEB) 1.25 MG/3 ML NEBU    Inhale 1 ampule into the lungs every 6 (six) hours as needed.       Start Date: --        End Date: --    ALBUTEROL (PROVENTIL/VENTOLIN HFA) 90 MCG/ACTUATION INHALER    Inhale 2 puffs into the lungs every 4 to 6 hours as needed for Wheezing.       Start Date: 9/14/2023 End Date: --    ATORVASTATIN (LIPITOR) 20 MG TABLET    Take 20 mg by mouth every evening.       Start Date: 4/11/2025 End Date: --    AZELASTINE (ASTELIN) 137 MCG (0.1 %) NASAL SPRAY    2 sprays 2 (two) times daily.       Start Date: 3/19/2024 End Date: --    CYPROHEPTADINE (PERIACTIN) 4 MG TABLET    Take 4 mg by mouth 2 (two) times daily.       Start Date: 6/5/2023  End Date: --    FLUTICASONE PROPIONATE (FLONASE) 50 MCG/ACTUATION NASAL SPRAY    1 spray by Each Nostril route as needed.       Start Date: 8/22/2023 End Date: --    IBUPROFEN (ADVIL,MOTRIN) 600 MG TABLET    Take 600 mg by mouth every 8 (eight) hours as needed.       Start Date: 2/19/2024 End Date: --    LORATADINE (CLARITIN) 10 MG TABLET    Take 10 mg by mouth once daily.       Start Date: --        End Date: --    MONTELUKAST (SINGULAIR) 10 MG TABLET    Take 10 mg by mouth once daily.       Start Date: 2/25/2024 End Date: --    PANTOPRAZOLE (PROTONIX) 20 MG TABLET    Take 1 tablet by mouth once daily.       Start Date: 1/23/2024 End Date: --    TRELEGY ELLIPTA 200-62.5-25 MCG INHALER    Inhale 1 puff into the  lungs once daily.       Start Date: --        End Date: --   Changed and/or Refilled Medications    Modified Medication Previous Medication    HYDROXYCHLOROQUINE (PLAQUENIL) 200 MG TABLET hydroxychloroquine (PLAQUENIL) 200 mg tablet       Take 1 tablet (200 mg total) by mouth once daily.    Take 1 tablet (200 mg total) by mouth once daily.       Start Date: 4/21/2025 End Date: --    Start Date: 11/18/2024End Date: 4/21/2025   Discontinued Medications    DICLOFENAC (VOLTAREN) 75 MG EC TABLET    Take 1 tablet (75 mg total) by mouth daily as needed (pain, after food).       Start Date: 9/8/2023  End Date: 4/21/2025    GABAPENTIN (NEURONTIN) 300 MG CAPSULE    Take 1 capsule (300 mg total) by mouth every evening.       Start Date: 11/18/2024End Date: 4/21/2025    MAGNESIUM OXIDE (MAG-OX) 400 MG (241.3 MG MAGNESIUM) TABLET    Take 1 tablet (400 mg total) by mouth nightly.       Start Date: 11/18/2024End Date: 4/21/2025         ICD-10-CM ICD-9-CM   1. Positive YING (antinuclear antibody)  R76.8 795.79   2. Interstitial pulmonary disease  J84.9 515   3. Fibromyalgia  M79.7 729.1   4. Restrictive lung disease  J98.4 518.89   5. Pain in both hands  M79.641 729.5    M79.642    6. Osteoarthritis of both hands, unspecified osteoarthritis type  M19.041 715.94    M19.042              Plan:       1. Positive YING (antinuclear antibody)  Overview:  YING 1:320 Nucleolar  YING 1:160 Homogenous      Assessment & Plan:  Currently taking  mg daily (reduced to dose based on body weight)  Repeat YING 1:320 Speckled. TPO neg, Ontiveros neg, SSA neg, SSB, Scl-70 negative, dsDNA neg, complements normal  No signs of Lupus     Continue Hydroxychloroquine 200 mg daily  Plaquenil Eye Exam: [facility]      Labs completed last week with PCP- BRIANNA to request those labs  Followed by Pulmonary- interstitial pulmonary disease    Orders:  -     hydroxychloroquine (PLAQUENIL) 200 mg tablet; Take 1 tablet (200 mg total) by mouth  once daily.  Dispense: 30 tablet; Refill: 5  -     X-Ray Hand 3 view Left; Future; Expected date: 04/21/2025  -     X-Ray Hand 3 view Right; Future; Expected date: 04/21/2025    2. Interstitial pulmonary disease  Assessment & Plan:  Reports always had bad allergies and developed Asthma in her 30's. She was never a smoker but exposed to second hand smoke. ( smoked and now children smoke) She is still exposed to second hand smoke.     Stable pulmonary symptoms. Denies SOB at rest. Does endorse intermittent SOB with exertion. Reports improvement since starting Trelegy (prescribed by Allergist)but is now having intermittent raspy voice.   Followed by Pulmonologist- Dr. Wyatt  Requested last office note/CT/PFT from pulmonary- last CT scan of chest from 2020. Patient reports she feels current pulmonologist is not helping her and does not explain with her, although she is stable.  Serologies negative for Lupus. RF and CCP negative. Anti- Scl-70 and RNA polymerase III negative    Order CT scan of chest- last CT from 2020  Patient is requesting a referral to a different pulmonologist- refer to [facility] after completion of CT chest.      Orders:  -     CT Chest High Resolution Without Contrast; Future; Expected date: 04/21/2025    3. Fibromyalgia  Assessment & Plan:  Stable-Improvement in myalgia and bilateral leg pain.       Continue Gabapentin 300 mg nightly  Continue magnesium oxide 400 mg nightly     Fibromyalgia is a chronic pain syndrome.  Underlying causes of fibromyalgia may be numerous.  An underlying nonrestorative sleep pattern, mental and physical stressors play a role in pain perception.  It is important to decrease stress levels and improving sleep patterns/hygiene, exercise and a healthy life-style is important in management of this syndrome.       4. Restrictive lung disease  Assessment & Plan:  Followed by Dr Wyatt.   Reports no recent CT scans.         5. Pain in both hands  Assessment &  Plan:  She does endorse pain in DIP of right 5th finger with changes noted  Baselines xray of bilateral hands ordered    Orders:  -     X-Ray Hand 3 view Left; Future; Expected date: 04/21/2025  -     X-Ray Hand 3 view Right; Future; Expected date: 04/21/2025    6. Osteoarthritis of both hands, unspecified osteoarthritis type  -     diclofenac sodium 1.5 % Drop; Apply 40 drops topically 4 (four) times daily as needed (for pain). To the affected areas. Spread evenly around joint  Dispense: 150 mL; Refill: 5           40 minutes of total time spent on the encounter, which includes face to face time and non-face to face time preparing to see the patient (eg, review of tests), Obtaining and/or reviewing separately obtained history, Documenting clinical information in the electronic or other health record, Independently interpreting results (not separately reported) and communicating results to the patient/family/caregiver, or Care coordination (not separately reported).

## 2025-04-21 NOTE — ASSESSMENT & PLAN NOTE
She does endorse pain in DIP of right 5th finger with changes noted  Baselines xray of bilateral hands ordered

## 2025-04-25 ENCOUNTER — HOSPITAL ENCOUNTER (OUTPATIENT)
Dept: RADIOLOGY | Facility: HOSPITAL | Age: 55
Discharge: HOME | End: 2025-04-25
Payer: MEDICARE

## 2025-04-25 DIAGNOSIS — M79.642 PAIN IN BOTH HANDS: ICD-10-CM

## 2025-04-25 DIAGNOSIS — M79.641 PAIN IN BOTH HANDS: ICD-10-CM

## 2025-04-25 DIAGNOSIS — J84.9 INTERSTITIAL PULMONARY DISEASE: ICD-10-CM

## 2025-04-25 DIAGNOSIS — R76.8 POSITIVE ANA (ANTINUCLEAR ANTIBODY): ICD-10-CM

## 2025-04-25 PROCEDURE — 73130 X-RAY EXAM OF HAND: CPT | Mod: TC,RT

## 2025-04-25 PROCEDURE — 71250 CT THORAX DX C-: CPT | Mod: TC

## 2025-04-25 PROCEDURE — 73130 X-RAY EXAM OF HAND: CPT | Mod: TC,LT

## 2025-04-28 ENCOUNTER — RESULTS FOLLOW-UP (OUTPATIENT)
Dept: RHEUMATOLOGY | Facility: CLINIC | Age: 55
End: 2025-04-28
Payer: MEDICARE

## 2025-04-28 DIAGNOSIS — J84.9 INTERSTITIAL PULMONARY DISEASE: Primary | ICD-10-CM

## 2025-04-28 NOTE — PROGRESS NOTES
Imaging reviewed. Xrays of bilateral hands do show degenerative changes (osteoarthritis with narrowing on the proximal and distal joints). The diclofenac solution should help with this. We will use these xrays as a baseline.    The CT of the chest findings were consistent with chronic interstitial lung disease overall slightly worse than compared to imaging from 2020. I will go ahead and send the referral to pulmonary at [facility] as she requested and we discussed during her office visit.      GERONIMO Ryan NP

## 2025-05-15 DIAGNOSIS — K80.20 CHOLELITHIASIS WITHOUT OBSTRUCTION: Primary | ICD-10-CM

## 2025-05-27 ENCOUNTER — HOSPITAL ENCOUNTER (OUTPATIENT)
Dept: RADIOLOGY | Facility: HOSPITAL | Age: 55
Discharge: HOME | End: 2025-05-27
Attending: SURGERY
Payer: MEDICARE

## 2025-05-27 ENCOUNTER — OFFICE VISIT (OUTPATIENT)
Dept: SURGICAL ONCOLOGY | Facility: CLINIC | Age: 55
End: 2025-05-27
Payer: MEDICARE

## 2025-05-27 VITALS
HEIGHT: 70 IN | WEIGHT: 148.63 LBS | TEMPERATURE: 98 F | BODY MASS INDEX: 21.28 KG/M2 | HEART RATE: 80 BPM | SYSTOLIC BLOOD PRESSURE: 122 MMHG | DIASTOLIC BLOOD PRESSURE: 82 MMHG

## 2025-05-27 DIAGNOSIS — K80.20 CHOLELITHIASIS WITHOUT OBSTRUCTION: ICD-10-CM

## 2025-05-27 DIAGNOSIS — K80.20 CALCULUS OF GALLBLADDER WITHOUT CHOLECYSTITIS WITHOUT OBSTRUCTION: Primary | ICD-10-CM

## 2025-05-27 DIAGNOSIS — Z01.818 PREOP TESTING: ICD-10-CM

## 2025-05-27 DIAGNOSIS — K80.20 CALCULUS OF GALLBLADDER WITHOUT CHOLECYSTITIS WITHOUT OBSTRUCTION: ICD-10-CM

## 2025-05-27 DIAGNOSIS — K80.20 CHOLELITHIASIS: ICD-10-CM

## 2025-05-27 PROCEDURE — 1159F MED LIST DOCD IN RCRD: CPT | Mod: CPTII,S$GLB,, | Performed by: NURSE PRACTITIONER

## 2025-05-27 PROCEDURE — 3008F BODY MASS INDEX DOCD: CPT | Mod: CPTII,S$GLB,, | Performed by: NURSE PRACTITIONER

## 2025-05-27 PROCEDURE — 3079F DIAST BP 80-89 MM HG: CPT | Mod: CPTII,S$GLB,, | Performed by: NURSE PRACTITIONER

## 2025-05-27 PROCEDURE — 3074F SYST BP LT 130 MM HG: CPT | Mod: CPTII,S$GLB,, | Performed by: NURSE PRACTITIONER

## 2025-05-27 PROCEDURE — 99999 PR PBB SHADOW E&M-EST. PATIENT-LVL IV: CPT | Mod: PBBFAC,,, | Performed by: NURSE PRACTITIONER

## 2025-05-27 PROCEDURE — 99203 OFFICE O/P NEW LOW 30 MIN: CPT | Mod: S$GLB,,, | Performed by: NURSE PRACTITIONER

## 2025-05-27 PROCEDURE — 71045 X-RAY EXAM CHEST 1 VIEW: CPT | Mod: TC

## 2025-05-27 PROCEDURE — 1160F RVW MEDS BY RX/DR IN RCRD: CPT | Mod: CPTII,S$GLB,, | Performed by: NURSE PRACTITIONER

## 2025-05-27 RX ORDER — ENOXAPARIN SODIUM 100 MG/ML
30 INJECTION SUBCUTANEOUS EVERY 24 HOURS
OUTPATIENT
Start: 2025-05-27

## 2025-05-27 RX ORDER — INDOCYANINE GREEN AND WATER 25 MG
2.5 KIT INJECTION ONCE
OUTPATIENT
Start: 2025-05-27 | End: 2025-05-27

## 2025-05-27 RX ORDER — CEFAZOLIN SODIUM 2 G/50ML
2 SOLUTION INTRAVENOUS
OUTPATIENT
Start: 2025-05-27

## 2025-05-27 NOTE — PROGRESS NOTES
History & Physical    CHIEF COMPLAINT:  GALLSTONES    History of Present Illness:  54 yeart-old-female referred by Tonia Ruiz NP.  Patient presented with complaints of right upper abdominal pain. Abdominal pain occurred 2 - 3 times post intake of fatty foods. Around 4 months ago she had a similar episode with worsening pain prompting further workup.  Ultrasound of the abdomen showed cholelithiasis with multiple intraluminal gallstones. She has been relatively pain free since the severe episode with diet limitation.     No significant cardiac history of anticoagulation. She does have restrictive lung disease followed by Dr Wyatt in [place]. She does not require home oxygen. On exam she has no dyspnea or respiratory distress at rest or with conversation. Surgical history includes  section.       Review of patient's allergies indicates:  No Known Allergies    Current Medications[1]    Past Medical History:   Diagnosis Date    Arthritis     Asthma 2024    COPD (chronic obstructive pulmonary disease)     GERD (gastroesophageal reflux disease) 2023    I take medicine for acid reflux    Rheumatoid arthritis 2020    Have aching pain in legs     Past Surgical History:   Procedure Laterality Date     SECTION       Family History   Problem Relation Name Age of Onset    Heart disease Mother Cynthia Perez         My mom have a pace maker    Colon cancer Father      Stomach cancer Father      Breast cancer Sister      Arthritis Maternal Grandmother Priscila fu         My mom mom had arthritis    Heart disease Maternal Grandmother Priscila fu         She had a bad heart    Cancer Sister Miriam Simeon         My sister had breast cancer she had both of them remove    Cancer Maternal Aunt Huma pereira         My mom sister had breast cancer she had one remove     Social History[2]     Review of Systems:  Review of Systems   Constitutional:  Negative for chills and  fever.   HENT: Negative.     Eyes:  Negative for visual disturbance.   Respiratory:  Negative for chest tightness and shortness of breath.    Cardiovascular:  Negative for chest pain.   Gastrointestinal: Negative.  Negative for nausea and vomiting.   Genitourinary:  Negative for difficulty urinating and hematuria.   Musculoskeletal: Negative.    Skin:  Negative for rash and wound.   Neurological: Negative.    Psychiatric/Behavioral: Negative.            Objective     Vital Signs (Most Recent)              Physical Exam:  Physical Exam  Constitutional:       General: She is not in acute distress.  HENT:      Head: Atraumatic.      Nose: Nose normal.      Mouth/Throat:      Mouth: Mucous membranes are moist.   Eyes:      General: No scleral icterus.     Extraocular Movements: Extraocular movements intact.      Conjunctiva/sclera: Conjunctivae normal.   Cardiovascular:      Rate and Rhythm: Normal rate and regular rhythm.      Pulses: Normal pulses.      Heart sounds: Normal heart sounds.   Pulmonary:      Effort: Pulmonary effort is normal. No respiratory distress.      Breath sounds: Normal breath sounds.   Abdominal:      General: Bowel sounds are normal. There is no distension.      Palpations: Abdomen is soft. There is no mass.      Tenderness: There is no abdominal tenderness. There is no guarding.   Musculoskeletal:         General: No swelling.      Cervical back: Normal range of motion and neck supple. No rigidity.   Skin:     General: Skin is warm and dry.   Neurological:      General: No focal deficit present.      Mental Status: She is alert and oriented to person, place, and time.   Psychiatric:         Mood and Affect: Mood normal.         Behavior: Behavior normal.              Assessment and Plan     Cholelithiasis with intermittent biliary colic       Plan for laparoscopic/robotic assisted cholecystectomy with intraoperative cholangi[facility] at [facility] Thursday, June 19. She will need preoperative clearance  from Dr Wyatt.        [1]   Current Outpatient Medications   Medication Sig Dispense Refill    albuterol (ACCUNEB) 1.25 mg/3 mL Nebu Inhale 1 ampule into the lungs every 6 (six) hours as needed.      albuterol (PROVENTIL/VENTOLIN HFA) 90 mcg/actuation inhaler Inhale 2 puffs into the lungs every 4 to 6 hours as needed for Wheezing. 18 g 0    atorvastatin (LIPITOR) 20 MG tablet Take 20 mg by mouth every evening.      azelastine (ASTELIN) 137 mcg (0.1 %) nasal spray 2 sprays 2 (two) times daily.      cyproheptadine (PERIACTIN) 4 mg tablet Take 4 mg by mouth 2 (two) times daily.      diclofenac sodium 1.5 % Drop Apply 40 drops topically 4 (four) times daily as needed (for pain). To the affected areas. Spread evenly around joint 150 mL 5    fluticasone propionate (FLONASE) 50 mcg/actuation nasal spray 1 spray by Each Nostril route as needed.      hydroxychloroquine (PLAQUENIL) 200 mg tablet Take 1 tablet (200 mg total) by mouth once daily. 30 tablet 5    ibuprofen (ADVIL,MOTRIN) 600 MG tablet Take 600 mg by mouth every 8 (eight) hours as needed.      loratadine (CLARITIN) 10 mg tablet Take 10 mg by mouth once daily.      montelukast (SINGULAIR) 10 mg tablet Take 10 mg by mouth once daily.      pantoprazole (PROTONIX) 20 MG tablet Take 1 tablet by mouth once daily.      TRELEGY ELLIPTA 200-62.5-25 mcg inhaler Inhale 1 puff into the lungs once daily.       No current facility-administered medications for this visit.   [2]   Social History  Tobacco Use    Smoking status: Never     Passive exposure: Never    Smokeless tobacco: Never   Substance Use Topics    Alcohol use: Yes     Alcohol/week: 1.0 standard drink of alcohol     Types: 1 Glasses of wine per week     Comment: monthly    Drug use: Never

## 2025-06-04 ENCOUNTER — ANESTHESIA EVENT (OUTPATIENT)
Dept: SURGERY | Facility: HOSPITAL | Age: 55
End: 2025-06-04
Payer: MEDICARE

## 2025-06-10 ENCOUNTER — TELEPHONE (OUTPATIENT)
Dept: SURGICAL ONCOLOGY | Facility: CLINIC | Age: 55
End: 2025-06-10
Payer: MEDICARE

## 2025-06-10 NOTE — TELEPHONE ENCOUNTER
Patient called in stating that she is scheduled for a cholecystectomy on 06/19 but her daughter is moving to [place] so she has no one to bring her. She would like to discuss other surgery dates. Please call, thank you!

## 2025-06-19 ENCOUNTER — ANESTHESIA (OUTPATIENT)
Dept: SURGERY | Facility: HOSPITAL | Age: 55
End: 2025-06-19
Payer: MEDICARE

## 2025-07-09 NOTE — PROGRESS NOTES
[facility]   Surgical Oncology  Progress Note    Subjective:   History of Present Illness:  54 yeart-old-female referred by Tonia Ruiz NP.  Patient presented with complaints of right upper abdominal pain. Abdominal pain occurred 2 - 3 times post intake of fatty foods. Around 4 months ago she had a similar episode with worsening pain prompting further workup.  Ultrasound of the abdomen showed cholelithiasis with multiple intraluminal gallstones. She has been relatively pain free since the severe episode with diet limitation.      No significant cardiac history of anticoagulation. She does have restrictive lung disease followed by Dr Wyatt in [place]. She does not require home oxygen. On exam she has no dyspnea or respiratory distress at rest or with conversation. Surgical history includes  section.         As of 25 there are no changes to patient medical history or status.       Review of patient's allergies indicates:  No Known Allergies    Post-Op Info:  Procedure(s) (LRB):  XI ROBOTIC CHOLECYSTECTOMY (N/A)          Medications:  Continuous Infusions:  Scheduled Meds:    PRN Meds:       Objective:     Vital Signs (Most Recent):    Vital Signs (24h Range):        No intake or output data in the 24 hours ending 25 1405    Physical Exam  Constitutional:       General: She is not in acute distress.  HENT:      Head: Atraumatic.      Nose: Nose normal.      Mouth/Throat:      Mouth: Mucous membranes are moist.   Eyes:      General: No scleral icterus.     Extraocular Movements: Extraocular movements intact.      Conjunctiva/sclera: Conjunctivae normal.   Cardiovascular:      Rate and Rhythm: Normal rate and regular rhythm.      Pulses: Normal pulses.      Heart sounds: Normal heart sounds.   Pulmonary:      Effort: Pulmonary effort is normal. No respiratory distress.      Breath sounds: Normal breath sounds.   Abdominal:      General: Bowel sounds are normal.  There is no distension.      Palpations: Abdomen is soft. There is no mass.      Tenderness: There is no abdominal tenderness. There is no guarding.   Musculoskeletal:         General: No swelling.      Cervical back: Normal range of motion and neck supple. No rigidity.   Skin:     General: Skin is warm and dry.   Neurological:      General: No focal deficit present.      Mental Status: She is alert and oriented to person, place, and time.   Psychiatric:         Mood and Affect: Mood normal.         Behavior: Behavior normal.         Significant Labs:   Latest Reference Range & Units 05/27/25 13:48   WBC 4.50 - 11.50 x10(3)/mcL 7.32   RBC 4.20 - 5.40 x10(6)/mcL 5.90 (H)   Hemoglobin 12.0 - 16.0 g/dL 11.0 (L)   Hematocrit 37.0 - 47.0 % 35.7 (L)   MCV 80.0 - 94.0 fL 60.5 (L)   MCH 27.0 - 31.0 pg 18.6 (L)   MCHC 33.0 - 36.0 g/dL 30.8 (L)   RDW 11.5 - 17.0 % 18.4 (H)   Platelet Count 130 - 400 x10(3)/mcL 183   Immature Platelet Fraction 0.9 - 11.2 % 9.7   MPV  See Comments   Platelet Estimate Normal, Adequate  Normal   Neut % % 65.0   LYMPH % % 21.9   Mono % % 8.9   Eos % % 3.6   Basophil % % 0.5   Immature Granulocytes % 0.1   Neut # 2.1 - 9.2 x10(3)/mcL 4.76   Lymph # 0.6 - 4.6 x10(3)/mcL 1.60   Mono # 0.1 - 1.3 x10(3)/mcL 0.65   Eos # 0 - 0.9 x10(3)/mcL 0.26   Baso # <=0.2 x10(3)/mcL 0.04   Immature Grans (Abs) 0.00 - 0.04 x10(3)/mcL 0.01   nRBC % 0.0   Microcytosis (none)  1+ !   Aniso (none)  1+ !   Poikilocytosis (none)  Slight !   RBC Morph Normal  Abnormal !   Target Cells (none)  Slight !   Sodium 136 - 145 mmol/L 138   Potassium 3.5 - 5.1 mmol/L 4.1   Chloride 98 - 107 mmol/L 104   CO2 22 - 29 mmol/L 25   Anion Gap mEq/L 9.0   BUN 9.8 - 20.1 mg/dL 7.8 (L)   Creatinine 0.55 - 1.02 mg/dL 0.73   BUN/CREAT RATIO  11   eGFR mL/min/1.73/m2 >60   Glucose 74 - 100 mg/dL 93   Calcium 8.4 - 10.2 mg/dL 9.1   ALP 40 - 150 unit/L 94   PROTEIN TOTAL 6.4 - 8.3 gm/dL 8.1   Albumin 3.5 - 5.0 g/dL 4.0   Albumin/Globulin Ratio  1.1 - 2.0 ratio 1.0 (L)   BILIRUBIN TOTAL <=1.5 mg/dL 0.5   AST 11 - 45 unit/L 28   ALT 0 - 55 unit/L 23   Globulin, Total 2.4 - 3.5 gm/dL 4.1 (H)   (H): Data is abnormally high  (L): Data is abnormally low  !: Data is abnormal    Significant Diagnostics:    reviewed    Assessment/Plan:     Cholelithiasis with intermittent biliary colic     Plan:     Continue with plan for Laparoscopic/robotic assisted cholecystectomy 7/10/25    Pita Mcqueen NP  Surgical Oncology  [facility]

## 2025-07-10 ENCOUNTER — HOSPITAL ENCOUNTER (OUTPATIENT)
Facility: HOSPITAL | Age: 55
Discharge: HOME | End: 2025-07-10
Attending: SURGERY | Admitting: SURGERY
Payer: MEDICARE

## 2025-07-10 DIAGNOSIS — K80.20 CALCULUS OF GALLBLADDER WITHOUT CHOLECYSTITIS WITHOUT OBSTRUCTION: ICD-10-CM

## 2025-07-10 DIAGNOSIS — K80.20 CHOLELITHIASIS: ICD-10-CM

## 2025-07-10 PROCEDURE — 36000710: Performed by: SURGERY

## 2025-07-10 PROCEDURE — 71000015 HC POSTOP RECOV 1ST HR: Performed by: SURGERY

## 2025-07-10 PROCEDURE — 63600175 PHARM REV CODE 636 W HCPCS

## 2025-07-10 PROCEDURE — 71000016 HC POSTOP RECOV ADDL HR: Performed by: SURGERY

## 2025-07-10 PROCEDURE — 63600175 PHARM REV CODE 636 W HCPCS: Performed by: ANESTHESIOLOGY

## 2025-07-10 PROCEDURE — 25000003 PHARM REV CODE 250: Performed by: SURGERY

## 2025-07-10 PROCEDURE — 71000033 HC RECOVERY, INTIAL HOUR: Performed by: SURGERY

## 2025-07-10 PROCEDURE — 37000008 HC ANESTHESIA 1ST 15 MINUTES: Performed by: SURGERY

## 2025-07-10 PROCEDURE — 25000003 PHARM REV CODE 250: Performed by: NURSE PRACTITIONER

## 2025-07-10 PROCEDURE — 25000003 PHARM REV CODE 250

## 2025-07-10 PROCEDURE — 88304 TISSUE EXAM BY PATHOLOGIST: CPT | Performed by: SURGERY

## 2025-07-10 PROCEDURE — 63600175 PHARM REV CODE 636 W HCPCS: Performed by: SURGERY

## 2025-07-10 PROCEDURE — 27201423 OPTIME MED/SURG SUP & DEVICES STERILE SUPPLY: Performed by: SURGERY

## 2025-07-10 PROCEDURE — 36000711: Performed by: SURGERY

## 2025-07-10 PROCEDURE — 37000009 HC ANESTHESIA EA ADD 15 MINS: Performed by: SURGERY

## 2025-07-10 RX ORDER — ROCURONIUM BROMIDE 10 MG/ML
INJECTION, SOLUTION INTRAVENOUS
Status: DISCONTINUED | OUTPATIENT
Start: 2025-07-10 | End: 2025-07-10

## 2025-07-10 RX ORDER — CEFAZOLIN SODIUM 1 G/3ML
2 INJECTION, POWDER, FOR SOLUTION INTRAMUSCULAR; INTRAVENOUS
Status: DISCONTINUED | OUTPATIENT
Start: 2025-07-10 | End: 2025-07-10 | Stop reason: HOSPADM

## 2025-07-10 RX ORDER — HYDROMORPHONE HYDROCHLORIDE 2 MG/ML
0.2 INJECTION, SOLUTION INTRAMUSCULAR; INTRAVENOUS; SUBCUTANEOUS EVERY 5 MIN PRN
Status: DISCONTINUED | OUTPATIENT
Start: 2025-07-10 | End: 2025-07-10 | Stop reason: HOSPADM

## 2025-07-10 RX ORDER — HYDROCODONE BITARTRATE AND ACETAMINOPHEN 5; 325 MG/1; MG/1
1 TABLET ORAL EVERY 6 HOURS PRN
Qty: 10 TABLET | Refills: 0 | Status: SHIPPED | OUTPATIENT
Start: 2025-07-10

## 2025-07-10 RX ORDER — ONDANSETRON HYDROCHLORIDE 2 MG/ML
INJECTION, SOLUTION INTRAVENOUS
Status: DISCONTINUED | OUTPATIENT
Start: 2025-07-10 | End: 2025-07-10

## 2025-07-10 RX ORDER — DIPHENHYDRAMINE HYDROCHLORIDE 50 MG/ML
25 INJECTION, SOLUTION INTRAMUSCULAR; INTRAVENOUS EVERY 6 HOURS PRN
Status: DISCONTINUED | OUTPATIENT
Start: 2025-07-10 | End: 2025-07-10 | Stop reason: HOSPADM

## 2025-07-10 RX ORDER — GLYCOPYRROLATE 0.2 MG/ML
INJECTION INTRAMUSCULAR; INTRAVENOUS
Status: DISCONTINUED | OUTPATIENT
Start: 2025-07-10 | End: 2025-07-10

## 2025-07-10 RX ORDER — FENTANYL CITRATE 50 UG/ML
INJECTION, SOLUTION INTRAMUSCULAR; INTRAVENOUS
Status: DISCONTINUED | OUTPATIENT
Start: 2025-07-10 | End: 2025-07-10

## 2025-07-10 RX ORDER — HYDROMORPHONE HYDROCHLORIDE 2 MG/ML
0.5 INJECTION, SOLUTION INTRAMUSCULAR; INTRAVENOUS; SUBCUTANEOUS EVERY 5 MIN PRN
Status: DISCONTINUED | OUTPATIENT
Start: 2025-07-10 | End: 2025-07-10 | Stop reason: HOSPADM

## 2025-07-10 RX ORDER — GLUCAGON 1 MG
1 KIT INJECTION
Status: DISCONTINUED | OUTPATIENT
Start: 2025-07-10 | End: 2025-07-10 | Stop reason: HOSPADM

## 2025-07-10 RX ORDER — LIDOCAINE HYDROCHLORIDE 20 MG/ML
INJECTION INTRAVENOUS
Status: DISCONTINUED | OUTPATIENT
Start: 2025-07-10 | End: 2025-07-10

## 2025-07-10 RX ORDER — ONDANSETRON HYDROCHLORIDE 2 MG/ML
4 INJECTION, SOLUTION INTRAVENOUS ONCE
Status: COMPLETED | OUTPATIENT
Start: 2025-07-10 | End: 2025-07-10

## 2025-07-10 RX ORDER — MIDAZOLAM HYDROCHLORIDE 1 MG/ML
INJECTION INTRAMUSCULAR; INTRAVENOUS
Status: DISCONTINUED | OUTPATIENT
Start: 2025-07-10 | End: 2025-07-10

## 2025-07-10 RX ORDER — BUPIVACAINE HYDROCHLORIDE 5 MG/ML
INJECTION, SOLUTION EPIDURAL; INTRACAUDAL; PERINEURAL
Status: DISCONTINUED | OUTPATIENT
Start: 2025-07-10 | End: 2025-07-10 | Stop reason: HOSPADM

## 2025-07-10 RX ORDER — HYDROCODONE BITARTRATE AND ACETAMINOPHEN 5; 325 MG/1; MG/1
1 TABLET ORAL EVERY 6 HOURS PRN
Refills: 0 | Status: DISCONTINUED | OUTPATIENT
Start: 2025-07-10 | End: 2025-07-10 | Stop reason: HOSPADM

## 2025-07-10 RX ORDER — CEFAZOLIN SODIUM 1 G/3ML
INJECTION, POWDER, FOR SOLUTION INTRAMUSCULAR; INTRAVENOUS
Status: DISCONTINUED | OUTPATIENT
Start: 2025-07-10 | End: 2025-07-10

## 2025-07-10 RX ORDER — ACETAMINOPHEN 10 MG/ML
INJECTION, SOLUTION INTRAVENOUS
Status: DISCONTINUED | OUTPATIENT
Start: 2025-07-10 | End: 2025-07-10

## 2025-07-10 RX ORDER — PROPOFOL 10 MG/ML
VIAL (ML) INTRAVENOUS
Status: DISCONTINUED | OUTPATIENT
Start: 2025-07-10 | End: 2025-07-10

## 2025-07-10 RX ORDER — ENOXAPARIN SODIUM 100 MG/ML
30 INJECTION SUBCUTANEOUS EVERY 24 HOURS
Status: DISCONTINUED | OUTPATIENT
Start: 2025-07-10 | End: 2025-07-10 | Stop reason: HOSPADM

## 2025-07-10 RX ORDER — ENOXAPARIN SODIUM 100 MG/ML
30 INJECTION SUBCUTANEOUS EVERY 24 HOURS
Status: DISCONTINUED | OUTPATIENT
Start: 2025-07-10 | End: 2025-07-10

## 2025-07-10 RX ORDER — INDOCYANINE GREEN AND WATER 25 MG
2.5 KIT INJECTION ONCE
Status: COMPLETED | OUTPATIENT
Start: 2025-07-10 | End: 2025-07-10

## 2025-07-10 RX ORDER — DEXAMETHASONE SODIUM PHOSPHATE 4 MG/ML
INJECTION, SOLUTION INTRA-ARTICULAR; INTRALESIONAL; INTRAMUSCULAR; INTRAVENOUS; SOFT TISSUE
Status: DISCONTINUED | OUTPATIENT
Start: 2025-07-10 | End: 2025-07-10

## 2025-07-10 RX ADMIN — ROCURONIUM BROMIDE 60 MG: 10 SOLUTION INTRAVENOUS at 09:07

## 2025-07-10 RX ADMIN — LIDOCAINE HYDROCHLORIDE 80 MG: 20 INJECTION INTRAVENOUS at 09:07

## 2025-07-10 RX ADMIN — ENOXAPARIN SODIUM 30 MG: 30 INJECTION SUBCUTANEOUS at 07:07

## 2025-07-10 RX ADMIN — PROPOFOL 60 MG: 10 INJECTION, EMULSION INTRAVENOUS at 09:07

## 2025-07-10 RX ADMIN — HYDROCODONE BITARTRATE AND ACETAMINOPHEN 1 TABLET: 5; 325 TABLET ORAL at 11:07

## 2025-07-10 RX ADMIN — ONDANSETRON 4 MG: 2 INJECTION INTRAMUSCULAR; INTRAVENOUS at 12:07

## 2025-07-10 RX ADMIN — MIDAZOLAM HYDROCHLORIDE 2 MG: 1 INJECTION, SOLUTION INTRAMUSCULAR; INTRAVENOUS at 09:07

## 2025-07-10 RX ADMIN — SUGAMMADEX 200 MG: 100 INJECTION, SOLUTION INTRAVENOUS at 10:07

## 2025-07-10 RX ADMIN — ONDANSETRON 4 MG: 2 INJECTION INTRAMUSCULAR; INTRAVENOUS at 10:07

## 2025-07-10 RX ADMIN — ACETAMINOPHEN 1000 MG: 10 INJECTION, SOLUTION INTRAVENOUS at 09:07

## 2025-07-10 RX ADMIN — FENTANYL CITRATE 50 MCG: 50 INJECTION, SOLUTION INTRAMUSCULAR; INTRAVENOUS at 10:07

## 2025-07-10 RX ADMIN — INDOCYANINE GREEN AND WATER 2.5 MG: KIT at 07:07

## 2025-07-10 RX ADMIN — DEXAMETHASONE SODIUM PHOSPHATE 8 MG: 4 INJECTION, SOLUTION INTRA-ARTICULAR; INTRALESIONAL; INTRAMUSCULAR; INTRAVENOUS; SOFT TISSUE at 09:07

## 2025-07-10 RX ADMIN — GLYCOPYRROLATE 0.2 MG: 0.2 INJECTION INTRAMUSCULAR; INTRAVENOUS at 10:07

## 2025-07-10 RX ADMIN — ROCURONIUM BROMIDE 20 MG: 10 SOLUTION INTRAVENOUS at 09:07

## 2025-07-10 RX ADMIN — SODIUM CHLORIDE, SODIUM GLUCONATE, SODIUM ACETATE, POTASSIUM CHLORIDE AND MAGNESIUM CHLORIDE: 526; 502; 368; 37; 30 INJECTION, SOLUTION INTRAVENOUS at 09:07

## 2025-07-10 RX ADMIN — CEFAZOLIN 2 G: 330 INJECTION, POWDER, FOR SOLUTION INTRAMUSCULAR; INTRAVENOUS at 09:07

## 2025-07-10 RX ADMIN — PROPOFOL 140 MG: 10 INJECTION, EMULSION INTRAVENOUS at 09:07

## 2025-07-10 RX ADMIN — FENTANYL CITRATE 100 MCG: 50 INJECTION, SOLUTION INTRAMUSCULAR; INTRAVENOUS at 09:07

## 2025-07-10 NOTE — ANESTHESIA PROCEDURE NOTES
Intubation    Date/Time: 7/10/2025 9:19 AM    Performed by: Pepper Shrestha CRNA  Authorized by: Carola Skinner MD    Intubation:     Induction:  Intravenous    Intubated:  Postinduction    Mask Ventilation:  Easy mask    Attempts:  1    Attempted By:  CRNA    Method of Intubation:  Direct    Blade:  Cindy 3    Laryngeal View Grade: Grade IIA - cords partially seen      Difficult Airway Encountered?: No      Complications:  None    Airway Device:  Oral endotracheal tube    Airway Device Size:  7.0    Style/Cuff Inflation:  Cuffed    Tube secured:  20    Secured at:  The lips    Placement Verified By:  Capnometry    Complicating Factors:  None    Findings Post-Intubation:  BS equal bilateral and atraumatic/condition of teeth unchanged

## 2025-07-10 NOTE — ANESTHESIA PREPROCEDURE EVALUATION
07/10/2025  Sarah Saravia is a 54 y.o., female.    Patient presented with complaints of right upper abdominal pain. Abdominal pain occurred 2 - 3 times post intake of fatty foods. Around 4 months ago she had a similar episode with worsening pain prompting further workup.  Ultrasound of the abdomen showed cholelithiasis with multiple intraluminal gallstones. She has been relatively pain free since the severe episode with diet limitation.      No significant cardiac history of anticoagulation. She does have restrictive lung disease followed by Dr Wyatt in [place]. She does not require home oxygen. On exam she has no dyspnea or respiratory distress at rest or with conversation. Surgical history includes  section.     Pre-op Assessment    I have reviewed the Patient Summary Reports.     I have reviewed the Nursing Notes. I have reviewed the NPO Status.   I have reviewed the Medications.     Review of Systems  Pulmonary:   COPD Asthma       Asthma:   Chronic Obstructive Pulmonary Disease (COPD):                      Hepatic/GI:     GERD         Gerd          Neurological:    Neuromuscular Disease,                                 Neuromuscular Disease       Physical Exam  General: Well nourished, Cooperative, Alert and Oriented    Airway:  Mallampati: II   Mouth Opening: Normal  TM Distance: Normal  Tongue: Normal  Neck ROM: Normal ROM    Dental:  Edentulous, Dentures        Anesthesia Plan  Type of Anesthesia, risks & benefits discussed:    Anesthesia Type: Gen ETT  Intra-op Monitoring Plan: Standard ASA Monitors  Post Op Pain Control Plan: multimodal analgesia  Induction:  IV  Airway Plan: Direct, Post-Induction  Informed Consent: Informed consent signed with the Patient and all parties understand the risks and agree with anesthesia plan.  All questions answered. Patient consented to blood  products? Yes  ASA Score: 3  Day of Surgery Review of History & Physical: H&P Update referred to the surgeon/provider.    Ready For Surgery From Anesthesia Perspective.     .

## 2025-07-10 NOTE — TRANSFER OF CARE
"Anesthesia Transfer of Care Note    Patient: Sarah Saravia    Procedure(s) Performed: Procedure(s) (LRB):  XI ROBOTIC CHOLECYSTECTOMY (N/A)    Patient location: PACU    Anesthesia Type: general    Transport from OR: Transported from OR on room air with adequate spontaneous ventilation    Post pain: adequate analgesia    Post assessment: no apparent anesthetic complications and tolerated procedure well    Post vital signs: stable    Level of consciousness: sedated    Nausea/Vomiting: no nausea/vomiting    Complications: none    Transfer of care protocol was followed    Last vitals: Visit Vitals  BP (!) 93/58 (BP Location: Right arm, Patient Position: Lying)   Pulse 90   Temp 36.9 °C (98.5 °F) (Oral)   Resp 14   Ht 5' 10" (1.778 m)   Wt 64.5 kg (142 lb 3.2 oz)   SpO2 95%   Breastfeeding No   BMI 20.40 kg/m²     "

## 2025-07-10 NOTE — ANESTHESIA POSTPROCEDURE EVALUATION
Anesthesia Post Evaluation    Patient: Sarah Saravia    Procedure(s) Performed: Procedure(s) (LRB):  XI ROBOTIC CHOLECYSTECTOMY (N/A)    Final Anesthesia Type: general      Patient location during evaluation: PACU  Patient participation: Yes- Able to Participate  Level of consciousness: awake and alert  Post-procedure vital signs: reviewed and stable  Pain management: adequate  Airway patency: patent      Anesthetic complications: no      Cardiovascular status: hemodynamically stable  Respiratory status: unassisted  Hydration status: euvolemic  Follow-up not needed.              Vitals Value Taken Time   /69 07/10/25 11:01   Temp 36 °C (96.8 °F) 07/10/25 10:50   Pulse 63 07/10/25 11:09   Resp 16 07/10/25 11:09   SpO2 99 % 07/10/25 11:09   Vitals shown include unfiled device data.      No case tracking events are documented in the log.      Pain/Aileen Score: Aileen Score: 8 (7/10/2025 11:00 AM)

## 2025-07-10 NOTE — H&P
[facility] - Periop Services  General Surgery  History & Physical    Patient Name: Sarah Saravia  MRN: [id number]  Admission Date: 7/10/2025  Attending Physician: Arsen Ramirez MD   Primary Care Provider: Tonia Ruiz NP    Patient information was obtained from patient and past medical records.     Subjective:     Chief Complaint/Reason for Admission: cholelithiasis     History of Present Illness:54 yeart-old-female referred by Tonia Ruiz NP.  Patient presented with complaints of right upper abdominal pain. Abdominal pain occurred 2 - 3 times post intake of fatty foods. Around 4 months ago she had a similar episode with worsening pain prompting further workup.  Ultrasound of the abdomen showed cholelithiasis with multiple intraluminal gallstones. She has been relatively pain free since the severe episode with diet limitation.      No significant cardiac history of anticoagulation. She does have restrictive lung disease followed by Dr Wyatt in [place]. She does not require home oxygen. On exam she has no dyspnea or respiratory distress at rest or with conversation. Surgical history includes  section.        No current facility-administered medications on file prior to encounter.     Current Outpatient Medications on File Prior to Encounter   Medication Sig    albuterol (ACCUNEB) 1.25 mg/3 mL Nebu Inhale 1 ampule into the lungs every 6 (six) hours as needed.    albuterol (PROVENTIL/VENTOLIN HFA) 90 mcg/actuation inhaler Inhale 2 puffs into the lungs every 4 to 6 hours as needed for Wheezing.    atorvastatin (LIPITOR) 20 MG tablet Take 20 mg by mouth. Does not take everyday    azelastine (ASTELIN) 137 mcg (0.1 %) nasal spray 2 sprays 2 (two) times daily.    cyproheptadine (PERIACTIN) 4 mg tablet Take 4 mg by mouth 3 (three) times a week.    hydroxychloroquine (PLAQUENIL) 200 mg tablet Take 1 tablet (200 mg total) by mouth once daily.    loratadine (CLARITIN) 10 mg tablet  Take 10 mg by mouth once daily.    montelukast (SINGULAIR) 10 mg tablet Take 10 mg by mouth once daily.    pantoprazole (PROTONIX) 20 MG tablet Take 1 tablet by mouth once daily.    TRELEGY ELLIPTA 200-62.5-25 mcg inhaler Inhale 1 puff into the lungs once daily.    diclofenac sodium 1.5 % Drop Apply 40 drops topically 4 (four) times daily as needed (for pain). To the affected areas. Spread evenly around joint    fluticasone propionate (FLONASE) 50 mcg/actuation nasal spray 1 spray by Each Nostril route as needed.    ibuprofen (ADVIL,MOTRIN) 600 MG tablet Take 600 mg by mouth every 8 (eight) hours as needed.       Review of patient's allergies indicates:  No Known Allergies    Past Medical History:   Diagnosis Date    Arthritis     Asthma 2024    Calculus of gallbladder without cholecystitis without obstruction     COPD (chronic obstructive pulmonary disease)     GERD (gastroesophageal reflux disease) 2023    I take medicine for acid reflux    Rheumatoid arthritis 2020    Have aching pain in legs     Past Surgical History:   Procedure Laterality Date     SECTION      DILATION AND CURETTAGE OF UTERUS           Family History       Problem Relation (Age of Onset)    Arthritis Maternal Grandmother    Breast cancer Sister    Cancer Sister, Maternal Aunt    Colon cancer Father    Heart disease Mother, Maternal Grandmother    Stomach cancer Father          Tobacco Use    Smoking status: Never     Passive exposure: Never    Smokeless tobacco: Never   Vaping Use    Vaping status: Never Used   Substance and Sexual Activity    Alcohol use: Yes     Alcohol/week: 1.0 standard drink of alcohol     Types: 1 Glasses of wine per week     Comment: monthly    Drug use: Never    Sexual activity: Yes     Partners: Male     Birth control/protection: Post-menopausal     Review of Systems  Objective:     Vital Signs (Most Recent):  Temp: 98.5 °F (36.9 °C) (07/10/25 0710)  Pulse: 70 (07/10/25 0710)  Resp:  18 (07/10/25 0710)  BP: 116/76 (07/10/25 0710)  SpO2: 100 % (07/10/25 0710) Vital Signs (24h Range):  Temp:  [98.5 °F (36.9 °C)] 98.5 °F (36.9 °C)  Pulse:  [70] 70  Resp:  [18] 18  SpO2:  [100 %] 100 %  BP: (116)/(76) 116/76     Weight: 64.5 kg (142 lb 3.2 oz)  Body mass index is 20.4 kg/m².    Physical Exam    Significant Labs:   Latest Reference Range & Units 05/27/25 13:48   WBC 4.50 - 11.50 x10(3)/mcL 7.32   RBC 4.20 - 5.40 x10(6)/mcL 5.90 (H)   Hemoglobin 12.0 - 16.0 g/dL 11.0 (L)   Hematocrit 37.0 - 47.0 % 35.7 (L)   MCV 80.0 - 94.0 fL 60.5 (L)   MCH 27.0 - 31.0 pg 18.6 (L)   MCHC 33.0 - 36.0 g/dL 30.8 (L)   RDW 11.5 - 17.0 % 18.4 (H)   Platelet Count 130 - 400 x10(3)/mcL 183   Immature Platelet Fraction 0.9 - 11.2 % 9.7   MPV  See Comments   Platelet Estimate Normal, Adequate  Normal   Neut % % 65.0   LYMPH % % 21.9   Mono % % 8.9   Eos % % 3.6   Basophil % % 0.5   Immature Granulocytes % 0.1   Neut # 2.1 - 9.2 x10(3)/mcL 4.76   Lymph # 0.6 - 4.6 x10(3)/mcL 1.60   Mono # 0.1 - 1.3 x10(3)/mcL 0.65   Eos # 0 - 0.9 x10(3)/mcL 0.26   Baso # <=0.2 x10(3)/mcL 0.04   Immature Grans (Abs) 0.00 - 0.04 x10(3)/mcL 0.01   nRBC % 0.0   Microcytosis (none)  1+ !   Aniso (none)  1+ !   Poikilocytosis (none)  Slight !   RBC Morph Normal  Abnormal !   Target Cells (none)  Slight !   Sodium 136 - 145 mmol/L 138   Potassium 3.5 - 5.1 mmol/L 4.1   Chloride 98 - 107 mmol/L 104   CO2 22 - 29 mmol/L 25   Anion Gap mEq/L 9.0   BUN 9.8 - 20.1 mg/dL 7.8 (L)   Creatinine 0.55 - 1.02 mg/dL 0.73   BUN/CREAT RATIO  11   eGFR mL/min/1.73/m2 >60   Glucose 74 - 100 mg/dL 93   Calcium 8.4 - 10.2 mg/dL 9.1   ALP 40 - 150 unit/L 94   PROTEIN TOTAL 6.4 - 8.3 gm/dL 8.1   Albumin 3.5 - 5.0 g/dL 4.0   Albumin/Globulin Ratio 1.1 - 2.0 ratio 1.0 (L)   BILIRUBIN TOTAL <=1.5 mg/dL 0.5   AST 11 - 45 unit/L 28   ALT 0 - 55 unit/L 23   Globulin, Total 2.4 - 3.5 gm/dL 4.1 (H)   (H): Data is abnormally high  (L): Data is abnormally low  !: Data is  abnormal    Significant Diagnostics:  I have reviewed all pertinent imaging results/findings within the past 24 hours.    Assessment/Plan:       Cholelithiasis with intermittent biliary colic      Plan:      Continue with plan for Laparoscopic/robotic assisted cholecystectomy 7/10/25          Pita Mcqueen NP  General Surgery  [facility] - [facility] Services

## 2025-07-10 NOTE — DISCHARGE SUMMARY
[facility] - Periop Services  Discharge Note  Short Stay    Procedure(s) (LRB):  XI ROBOTIC CHOLECYSTECTOMY (N/A)      OUTCOME: Patient tolerated treatment/procedure well without complication and is now ready for discharge.    DISPOSITION: Home or Self Care    FINAL DIAGNOSIS:  Calculus of gallbladder without cholecystitis without obstruction    FOLLOWUP: In clinic    DISCHARGE INSTRUCTIONS:    Discharge Procedure Orders   Diet Adult Regular     Lifting restrictions   Order Comments: Do not lift objects heavier than 10 pounds for 2 weeks     No driving until:   Order Comments: OK to drive 4 days after surgery if no longer taking narcotic pain medication.     No dressing needed   Order Comments: Leave lap sites open to air. Glue will fall off, no need to remove. No tub baths or soaking for 4 weeks. Ok for shower starting 24 hours after surgery.        TIME SPENT ON DISCHARGE: 30 minutes

## 2025-07-14 VITALS
DIASTOLIC BLOOD PRESSURE: 73 MMHG | BODY MASS INDEX: 20.36 KG/M2 | RESPIRATION RATE: 18 BRPM | TEMPERATURE: 97 F | HEIGHT: 70 IN | OXYGEN SATURATION: 100 % | HEART RATE: 60 BPM | SYSTOLIC BLOOD PRESSURE: 112 MMHG | WEIGHT: 142.19 LBS

## 2025-07-14 LAB — PSYCHE PATHOLOGY RESULT: NORMAL

## 2025-07-15 NOTE — OP NOTE
Date of Surgery:  07/10/2025    Surgeon:  Arsen Ramirez MD    Assistant:  Pita BROWN                                        Pre-op Diagnosis:  Chronic cholecystitis, cholelithiasis    Post-op Diagnosis:  Same    Procedure:  Laparoscopic cholecystectomy with intraoperative cholangiography (via ICG fluorescence)    Anesthesia:  GETA    EBL:  Less than 25 cc    Specimens:  Gallbladder    Findings:  Acute inflammation and distention of the gallbladder.  ICG was administered preoperatively and used for intraoperative fluorescence and cholangiography with clear identification of the anatomy    Procedure in detail: After informed consent was obtained the patient was brought to the operating room and placed in the supine position.  ICG was administered intravenously preoperatively.  General endotracheal anesthesia was administered without difficulty.  The patient's abdomen was prepped and draped in sterile fashion.  After infiltration with local anesthesia, a small incision was made above the umbilicus and an optical trocar was used to enter the peritoneal cavity under direct vision.  Pneumoperitoneum was achieved without difficulty.  Additional ports were placed in the epigastrium and right upper quadrant under direct vision.  The patient was placed in steep reverse Trendelenburg position, and tilted towards the left.  The gallbladder was visualized in the right upper quadrant, and demonstrated signs of chronic inflammation and distention.  The fundus was retracted cephalad, the infundibulum was retracted laterally.  The cystic duct was identified and dissected circumferentially using gentle blunt dissection at its confluence with the gallbladder.  The cystic artery was similarly dissected at its entrance into the gallbladder.  Firefly visualization/fluorescence was then used and the biliary anatomy was defined via fluorescence, there was no aberrant anatomy, the common bile duct was clearly visualized as well as  the cystic duct.  Once the anatomy was clearly defined, and the critical view of safety was obtained, the cystic duct was divided between clips with 2 clips placed on the staying inside.  The cystic artery was divided between clips.  The gallbladder was excised from gallbladder fossa using hook electrocautery.  Meticulous hemostasis was achieved.  The gallbladder is decompressed, placed in an Endo Catch bag and removed.  The right upper quadrant was irrigated and inspected for hemostasis which appeared to be excellent.  Ports were removed, pneumoperitoneum was relieved, port sites were closed with absorbable suture and sterile dressings were applied.  The patient tolerated the procedure well.  They were brought to recovery room in stable condition    Significant surgical tasks conducted by the assistant:  The skilled assistance of the nurse practitioner KEVIN Reeves was necessary for the successful completion of this case.  She was essential for the proper positioning of the patient, manipulation of instruments, proper exposure, manipulation of tissue, and wound closure          Brief Discharge Note:    Outcome: Satisfactory  Disposition: Discharged home postoperatively in good condition  Followup:  2 weeks  Final Diagnosis same as postoperative diagnosis     Arsen Ramirez MD  Surgical Oncology  [facility], Gastrointestinal and Hepatobiliary Surgery

## 2025-07-23 ENCOUNTER — OFFICE VISIT (OUTPATIENT)
Dept: SURGICAL ONCOLOGY | Facility: CLINIC | Age: 55
End: 2025-07-23
Payer: MEDICARE

## 2025-07-23 VITALS
SYSTOLIC BLOOD PRESSURE: 107 MMHG | WEIGHT: 146 LBS | BODY MASS INDEX: 20.9 KG/M2 | HEART RATE: 76 BPM | TEMPERATURE: 98 F | HEIGHT: 70 IN | RESPIRATION RATE: 18 BRPM | OXYGEN SATURATION: 98 % | DIASTOLIC BLOOD PRESSURE: 67 MMHG

## 2025-07-23 DIAGNOSIS — K80.20 CALCULUS OF GALLBLADDER WITHOUT CHOLECYSTITIS WITHOUT OBSTRUCTION: Primary | ICD-10-CM

## 2025-07-23 PROCEDURE — 99999 PR PBB SHADOW E&M-EST. PATIENT-LVL IV: CPT | Mod: PBBFAC,,, | Performed by: NURSE PRACTITIONER

## 2025-07-23 NOTE — PROGRESS NOTES
Surgical Oncology Clinic    Patient ID: [id number]     Chief Complaint: Post-op Evaluation (2 wk PO: laparoscopic/robotic assisted cholecystectomy with intraoperative cholangiogram 07/10/25)      HPI:     Sarah Saravia is a 54 y.o. female here today for  follow up status post laparoscopic/robotic cholecystectomy on 7/10. Incisions are healing well and approximated without evidence or erythema or drainage. Patient denies issues with oral intake of liquids of solids, voiding, or bowel movements. Pathology report revealed chronic cholecystitis with cholelithiasis. Reviewed with patient.      Current Outpatient Medications   Medication Instructions    albuterol (ACCUNEB) 1.25 mg/3 mL Nebu 1 ampule, Every 6 hours PRN    albuterol (PROVENTIL/VENTOLIN HFA) 90 mcg/actuation inhaler 2 puffs, Inhalation, Every 4-6 hours PRN    atorvastatin (LIPITOR) 20 mg    azelastine (ASTELIN) 137 mcg (0.1 %) nasal spray 2 sprays, 2 times daily    cyproheptadine (PERIACTIN) 4 mg, Three times weekly    fluticasone propionate (FLONASE) 50 mcg/actuation nasal spray 1 spray, As needed (PRN)    HYDROcodone-acetaminophen (NORCO) 5-325 mg per tablet 1 tablet, Oral, Every 6 hours PRN    hydroxychloroquine (PLAQUENIL) 200 mg, Oral, Daily    ibuprofen (ADVIL,MOTRIN) 600 mg, Every 8 hours PRN    loratadine (CLARITIN) 10 mg, Daily    montelukast (SINGULAIR) 10 mg, Daily    pantoprazole (PROTONIX) 20 MG tablet 1 tablet, Daily    TRELEGY ELLIPTA 200-62.5-25 mcg inhaler 1 puff, Daily       Review of patient's allergies indicates:  No Known Allergies     Patient Care Team:  Tonia Ruiz NP as PCP - General (Family Medicine)  Rhina Ryan FNP as Nurse Practitioner (Rheumatology)     Subjective:     Review of Systems    12 point review of systems conducted, negative except as stated in the history of present illness. See HPI for details.    Objective:     Visit Vitals  /67 (BP Location: Left forearm, Patient Position: Sitting)  "  Pulse 76   Temp 98.2 °F (36.8 °C) (Oral)   Resp 18   Ht 5' 10" (1.778 m)   Wt 66.2 kg (146 lb)   SpO2 98%   BMI 20.95 kg/m²       Physical Exam  Constitutional:       General: She is not in acute distress.  HENT:      Head: Atraumatic.      Nose: Nose normal.      Mouth/Throat:      Mouth: Mucous membranes are moist.   Eyes:      General: No scleral icterus.     Extraocular Movements: Extraocular movements intact.      Conjunctiva/sclera: Conjunctivae normal.   Cardiovascular:      Rate and Rhythm: Normal rate and regular rhythm.      Pulses: Normal pulses.      Heart sounds: Normal heart sounds.   Pulmonary:      Effort: Pulmonary effort is normal. No respiratory distress.      Breath sounds: Normal breath sounds.   Abdominal:      General: Bowel sounds are normal. There is no distension.      Palpations: Abdomen is soft. There is no mass.      Tenderness: There is no abdominal tenderness. There is no guarding.      Comments: Lap sites x4 C/D/I   Musculoskeletal:         General: No swelling.      Cervical back: Normal range of motion and neck supple. No rigidity.   Skin:     General: Skin is warm and dry.   Neurological:      General: No focal deficit present.      Mental Status: She is alert and oriented to person, place, and time.   Psychiatric:         Mood and Affect: Mood normal.         Behavior: Behavior normal.         Assessment/Plan:     Chronic cholecystitis and cholelithiasis status post laparoscopic/robotic cholecystectomy 7/10    Plan:     Ok to increase activity level and diet as tolerated.   Ok for tub bath   No follow up appointment necessary.       Pita Mcqueen, NP-C  General Surgery   Surgical Oncology     "

## (undated) DEVICE — SYR 10CC LUER LOCK

## (undated) DEVICE — CHLORAPREP W TINT 26ML APPL

## (undated) DEVICE — SOL CLEARIFY VISUALIZATION LAP

## (undated) DEVICE — SUT MCRYL PLUS 4-0 PS2 27IN

## (undated) DEVICE — CLIP HEMO-LOK ML

## (undated) DEVICE — TOWEL OR DISP STRL BLUE 4/PK

## (undated) DEVICE — IRRIGATOR SUCTION W/TIP

## (undated) DEVICE — PAD PINK TRENDELENBURG POS XL

## (undated) DEVICE — KIT AIRSEAL CANN CAP STD 8MM

## (undated) DEVICE — COVER MAYO STND XL 30X57IN

## (undated) DEVICE — COVER TIP CURVED SCISSORS XI

## (undated) DEVICE — TROCAR KII FIOS ZTHREAD 11X100

## (undated) DEVICE — ELECTRODE PATIENT RETURN DISP

## (undated) DEVICE — OBTURATOR BLADELESS 8MM XI CLR

## (undated) DEVICE — DRAPE ARM DAVINCI XI

## (undated) DEVICE — SEAL CANN UNIVERSAL 5-12MM

## (undated) DEVICE — SOL ELECTROLUBE ANTI-STIC

## (undated) DEVICE — KIT AIRSEAL BIFURCT FLTR TB

## (undated) DEVICE — NDL HYPO REG 25G X 1 1/2

## (undated) DEVICE — DRAPE COLUMN DAVINCI XI

## (undated) DEVICE — GLOVE PROTEXIS HYDROGEL SZ7

## (undated) DEVICE — ADHESIVE DERMABOND ADVANCED

## (undated) DEVICE — SOL NACL IRR 1000ML BTL

## (undated) DEVICE — HOLDER STRIP-T SELF ADH 2X10IN

## (undated) DEVICE — KIT GEN LAPAROSCOPY LAFAYETTE

## (undated) DEVICE — BAG TISSUE RETRIEVAL 5MM

## (undated) DEVICE — GLOVE PROTEXIS LTX MICRO 6.5

## (undated) DEVICE — KIT SURGICAL TURNOVER